# Patient Record
Sex: FEMALE | NOT HISPANIC OR LATINO | Employment: FULL TIME | URBAN - METROPOLITAN AREA
[De-identification: names, ages, dates, MRNs, and addresses within clinical notes are randomized per-mention and may not be internally consistent; named-entity substitution may affect disease eponyms.]

---

## 2019-12-28 ENCOUNTER — NURSE TRIAGE (OUTPATIENT)
Dept: NURSING | Facility: CLINIC | Age: 29
End: 2019-12-28

## 2019-12-28 NOTE — TELEPHONE ENCOUNTER
Wants to get an appointment with a female MD for evaluation of symptoms since the metronidazole didn't work.  I connected with scheduling for an appointment at the [facility].  She is a new patient. Her  will go and get some over the counter product to try in the mean time.  Isela Larry RN-[facility]

## 2020-01-03 ENCOUNTER — TELEPHONE (OUTPATIENT)
Dept: FAMILY MEDICINE | Facility: CLINIC | Age: 30
End: 2020-01-03

## 2020-01-03 ENCOUNTER — OFFICE VISIT (OUTPATIENT)
Dept: FAMILY MEDICINE | Facility: CLINIC | Age: 30
End: 2020-01-03
Payer: COMMERCIAL

## 2020-01-03 VITALS
TEMPERATURE: 98.8 F | BODY MASS INDEX: 18.88 KG/M2 | HEART RATE: 79 BPM | OXYGEN SATURATION: 97 % | WEIGHT: 100 LBS | SYSTOLIC BLOOD PRESSURE: 100 MMHG | HEIGHT: 61 IN | DIASTOLIC BLOOD PRESSURE: 63 MMHG

## 2020-01-03 DIAGNOSIS — R30.0 DYSURIA: Primary | ICD-10-CM

## 2020-01-03 DIAGNOSIS — Z12.4 SCREENING FOR MALIGNANT NEOPLASM OF CERVIX: ICD-10-CM

## 2020-01-03 DIAGNOSIS — N89.8 VAGINAL ODOR: ICD-10-CM

## 2020-01-03 LAB
ALBUMIN UR-MCNC: NEGATIVE MG/DL
APPEARANCE UR: CLEAR
BILIRUB UR QL STRIP: NEGATIVE
COLOR UR AUTO: YELLOW
GLUCOSE UR STRIP-MCNC: NEGATIVE MG/DL
HGB UR QL STRIP: NEGATIVE
KETONES UR STRIP-MCNC: NEGATIVE MG/DL
LEUKOCYTE ESTERASE UR QL STRIP: ABNORMAL
NITRATE UR QL: NEGATIVE
NON-SQ EPI CELLS #/AREA URNS LPF: NORMAL /LPF
PH UR STRIP: 6.5 PH (ref 5–7)
RBC #/AREA URNS AUTO: NORMAL /HPF
SOURCE: ABNORMAL
SP GR UR STRIP: <=1.005 (ref 1–1.03)
SPECIMEN SOURCE: NORMAL
UROBILINOGEN UR STRIP-ACNC: 0.2 EU/DL (ref 0.2–1)
WBC #/AREA URNS AUTO: NORMAL /HPF
WET PREP SPEC: NORMAL

## 2020-01-03 PROCEDURE — 87210 SMEAR WET MOUNT SALINE/INK: CPT | Performed by: PHYSICIAN ASSISTANT

## 2020-01-03 PROCEDURE — 99203 OFFICE O/P NEW LOW 30 MIN: CPT | Performed by: PHYSICIAN ASSISTANT

## 2020-01-03 PROCEDURE — G0145 SCR C/V CYTO,THINLAYER,RESCR: HCPCS | Performed by: PHYSICIAN ASSISTANT

## 2020-01-03 PROCEDURE — 81001 URINALYSIS AUTO W/SCOPE: CPT | Performed by: PHYSICIAN ASSISTANT

## 2020-01-03 RX ORDER — FLUCONAZOLE 150 MG/1
150 TABLET ORAL ONCE
Qty: 1 TABLET | Refills: 0 | Status: SHIPPED | OUTPATIENT
Start: 2020-01-03 | End: 2020-03-17

## 2020-01-03 ASSESSMENT — MIFFLIN-ST. JEOR: SCORE: 1122.01

## 2020-01-03 ASSESSMENT — PAIN SCALES - GENERAL: PAINLEVEL: SEVERE PAIN (6)

## 2020-01-03 NOTE — PROGRESS NOTES
"Subjective     Due to language barrier, an  was present during the history-taking and subsequent discussion (and for part of the physical exam) with this patient.    Flor Lopes is a 29 year old female who presents to clinic today for the following health issues:    HPI   URINARY TRACT SYMPTOMS  Onset: 1 month    Description:   Painful urination (Dysuria): YES           Frequency: YES  Blood in urine (Hematuria): no   Delay in urine (Hesitency): YES    Intensity: moderate    Progression of Symptoms:  same    Accompanying Signs & Symptoms:  Fever/chills: no   Flank pain no   Nausea and vomiting: no   Any vaginal symptoms: vaginal odor  Abdominal/Pelvic Pain: no     History:   History of frequent UTI's: no   History of kidney stones: no   Sexually Active: YES  Possibility of pregnancy: No    Precipitating factors:       Therapies Tried and outcome: Increase fluid intake    No vaginal itching.  Doesn't get uti or vaginal infections.  No concern for std      There is no problem list on file for this patient.    History reviewed. No pertinent surgical history.    Social History     Tobacco Use     Smoking status: Never Smoker     Smokeless tobacco: Never Used   Substance Use Topics     Alcohol use: Not Currently     History reviewed. No pertinent family history.          Reviewed and updated as needed this visit by Provider         Review of Systems   As above      Objective    /63 (BP Location: Right arm, Patient Position: Chair, Cuff Size: Adult Small)   Pulse 79   Temp 98.8  F (37.1  C) (Oral)   Ht 1.543 m (5' 0.75\")   Wt 45.4 kg (100 lb)   LMP 12/10/2019   SpO2 97%   Breastfeeding No   BMI 19.05 kg/m    Body mass index is 19.05 kg/m .  Physical Exam  Constitutional:       General: She is not in acute distress.  Genitourinary:     Vagina: Vaginal discharge (thick and white ) present.      Cervix: Normal.   Neurological:      Mental Status: She is alert.            Diagnostic Test " Results:  Results for orders placed or performed in visit on 01/03/20 (from the past 24 hour(s))   UA reflex to Microscopic and Culture   Result Value Ref Range    Color Urine Yellow     Appearance Urine Clear     Glucose Urine Negative NEG^Negative mg/dL    Bilirubin Urine Negative NEG^Negative    Ketones Urine Negative NEG^Negative mg/dL    Specific Gravity Urine <=1.005 1.003 - 1.035    Blood Urine Negative NEG^Negative    pH Urine 6.5 5.0 - 7.0 pH    Protein Albumin Urine Negative NEG^Negative mg/dL    Urobilinogen Urine 0.2 0.2 - 1.0 EU/dL    Nitrite Urine Negative NEG^Negative    Leukocyte Esterase Urine Trace (A) NEG^Negative    Source Midstream Urine    Urine Microscopic   Result Value Ref Range    WBC Urine 0 - 5 OTO5^0 - 5 /HPF    RBC Urine O - 2 OTO2^O - 2 /HPF    Squamous Epithelial /LPF Urine Few FEW^Few /LPF   Wet prep   Result Value Ref Range    Specimen Description Vagina     Wet Prep No Trichomonas seen     Wet Prep No clue cells seen     Wet Prep No yeast seen     Wet Prep Few  WBC'S seen              Assessment & Plan     1. Dysuria  Labs normal  - UA reflex to Microscopic and Culture  - Urine Microscopic    2. Vaginal odor  Will treat for yeast given the amount of discharge.  Pt notified after office visit   - Wet prep    3. Screening for malignant neoplasm of cervix    - Pap imaged thin layer screen reflex to HPV if ASCUS - recommend age 25 - 29           Return in about 1 week (around 1/10/2020) for if not improving.    Yenny Gomez PA-C  [facility]

## 2020-01-03 NOTE — TELEPHONE ENCOUNTER
Panel Management Review      Patient has the following on her problem list: None      Composite cancer screening  Chart review shows that this patient is due/due soon for the following Pap Smear  Summary:    Patient is due/failing the following:   PAP    Action needed:   Patient in office today and discussed HM item due, patient was willing to complete today the pap screening test.    Type of outreach:    see above    Questions for provider review:    None                                                                                                                                    ALDAIR Daniel MA       Chart routed to closing encounter .

## 2020-01-03 NOTE — TELEPHONE ENCOUNTER
Received the following message from Yenny Gomez PA-C:    Please call  (Pt was to sign a consent today at office visit) and let him know her labs are all normal but given the amount of discharge I am going to send a prescription for diflucan.

## 2020-01-03 NOTE — LETTER
January 8, 2020      Flor Lopes  [address] [address]    Dear MsHermesMarianne,      I am happy to inform you that your recent cervical cancer screening test (PAP smear) was normal.      Preventative screenings such as this help to ensure your health for years to come. You should repeat a pap smear in 3 years, unless otherwise directed.      You will still need to return to the clinic every year for your annual exam and other preventive tests.     If you have additional questions regarding this result, please call our registered nurse, Josey at [phone].      Sincerely,      Yenny Gomez PA-C //[facility]

## 2020-01-07 LAB
COPATH REPORT: NORMAL
PAP: NORMAL

## 2020-01-15 ENCOUNTER — OFFICE VISIT (OUTPATIENT)
Dept: FAMILY MEDICINE | Facility: CLINIC | Age: 30
End: 2020-01-15
Payer: COMMERCIAL

## 2020-01-15 VITALS
DIASTOLIC BLOOD PRESSURE: 62 MMHG | WEIGHT: 103 LBS | HEART RATE: 76 BPM | HEIGHT: 61 IN | SYSTOLIC BLOOD PRESSURE: 101 MMHG | TEMPERATURE: 98.5 F | BODY MASS INDEX: 19.45 KG/M2

## 2020-01-15 DIAGNOSIS — N94.89 VAGINAL BURNING: Primary | ICD-10-CM

## 2020-01-15 LAB
ANION GAP SERPL CALCULATED.3IONS-SCNC: 4 MMOL/L (ref 3–14)
BUN SERPL-MCNC: 13 MG/DL (ref 7–30)
CALCIUM SERPL-MCNC: 9.5 MG/DL (ref 8.5–10.1)
CHLORIDE SERPL-SCNC: 106 MMOL/L (ref 94–109)
CO2 SERPL-SCNC: 28 MMOL/L (ref 20–32)
CREAT SERPL-MCNC: 0.56 MG/DL (ref 0.52–1.04)
GFR SERPL CREATININE-BSD FRML MDRD: >90 ML/MIN/{1.73_M2}
GLUCOSE SERPL-MCNC: 91 MG/DL (ref 70–99)
POTASSIUM SERPL-SCNC: 4.3 MMOL/L (ref 3.4–5.3)
SODIUM SERPL-SCNC: 138 MMOL/L (ref 133–144)
SPECIMEN SOURCE: NORMAL
WET PREP SPEC: NORMAL

## 2020-01-15 PROCEDURE — 87210 SMEAR WET MOUNT SALINE/INK: CPT | Performed by: PHYSICIAN ASSISTANT

## 2020-01-15 PROCEDURE — 87591 N.GONORRHOEAE DNA AMP PROB: CPT | Performed by: PHYSICIAN ASSISTANT

## 2020-01-15 PROCEDURE — 80048 BASIC METABOLIC PNL TOTAL CA: CPT | Performed by: PHYSICIAN ASSISTANT

## 2020-01-15 PROCEDURE — 87491 CHLMYD TRACH DNA AMP PROBE: CPT | Performed by: PHYSICIAN ASSISTANT

## 2020-01-15 PROCEDURE — 99213 OFFICE O/P EST LOW 20 MIN: CPT | Performed by: PHYSICIAN ASSISTANT

## 2020-01-15 PROCEDURE — 36415 COLL VENOUS BLD VENIPUNCTURE: CPT | Performed by: PHYSICIAN ASSISTANT

## 2020-01-15 RX ORDER — NYSTATIN 100000 U/G
CREAM TOPICAL 2 TIMES DAILY
Qty: 30 G | Refills: 1 | Status: SHIPPED | OUTPATIENT
Start: 2020-01-15 | End: 2021-08-23

## 2020-01-15 ASSESSMENT — MIFFLIN-ST. JEOR: SCORE: 1136.83

## 2020-01-15 NOTE — PROGRESS NOTES
"Subjective     Flor Lopes is a 29 year old female who presents to clinic today for the following health issues:  Due to language barrier, an  was present during the history-taking and subsequent discussion (and for part of the physical exam) with this patient.    HPI   Vaginal Symptoms  Onset: 1 month     Description:  Vaginal Discharge: none   Itching (Pruritis): no   Burning sensation:  YES   Odor: no     Accompanying Signs & Symptoms:  Pain with Urination: YES  Abdominal Pain: no   Fever: no     History:   Sexually active: no   New Partner: no   Possibility of Pregnancy:  No    Precipitating factors:   Recent Antibiotic Use: no     Alleviating factors:  None     Therapies Tried and outcome: finished Diflucan -better but not gone   No skin lesions.    No blood in urine.  No increase in frequency or leaking.  No fevers. No concerns for STD       There is no problem list on file for this patient.    No past surgical history on file.    Social History     Tobacco Use     Smoking status: Never Smoker     Smokeless tobacco: Never Used   Substance Use Topics     Alcohol use: Not Currently     No family history on file.          Reviewed and updated as needed this visit by Provider         Review of Systems   As above      Objective    /62   Pulse 76   Temp 98.5  F (36.9  C) (Oral)   Ht 1.545 m (5' 0.83\")   Wt 46.7 kg (103 lb)   LMP 12/10/2019 (Exact Date)   BMI 19.57 kg/m    Body mass index is 19.57 kg/m .  Physical Exam  Constitutional:       General: She is not in acute distress.  Cardiovascular:      Rate and Rhythm: Normal rate and regular rhythm.   Pulmonary:      Effort: Pulmonary effort is normal.      Breath sounds: Normal breath sounds.   Abdominal:      Tenderness: There is no abdominal tenderness. There is no right CVA tenderness or left CVA tenderness.   Neurological:      Mental Status: She is alert.            Diagnostic Test Results:  Labs reviewed in Epic        Assessment & Plan "     1. Vaginal burning  Will give nystatin since did improve some on diflucan.  Will check stds to be complete.  Also r/o diabetes with labs.    - Wet prep  - Chlamydia trachomatis PCR  - Neisseria gonorrhoeae PCR  - Basic metabolic panel  - nystatin (MYCOSTATIN) 170461 UNIT/GM external cream; Apply topically 2 times daily To vaginal area, use thin layer  Dispense: 30 g; Refill: 1           No follow-ups on file.    Yenny Gomez PA-C  [facility]

## 2020-01-15 NOTE — LETTER
[facility]   [address] [place]  [address]  [phone]                                   January 20, 2020    Flor Lopes  [address] [address]        Dear Flor,    Sexually transmitted disease testing is negative and her blood sugar is normal.  (she gave permission in office to give this info to her )    Results for orders placed or performed in visit on 01/15/20   Basic metabolic panel     Status: None   Result Value Ref Range    Sodium 138 133 - 144 mmol/L    Potassium 4.3 3.4 - 5.3 mmol/L    Chloride 106 94 - 109 mmol/L    Carbon Dioxide 28 20 - 32 mmol/L    Anion Gap 4 3 - 14 mmol/L    Glucose 91 70 - 99 mg/dL    Urea Nitrogen 13 7 - 30 mg/dL    Creatinine 0.56 0.52 - 1.04 mg/dL    GFR Estimate >90 >60 mL/min/[1.73_m2]    GFR Estimate If Black >90 >60 mL/min/[1.73_m2]    Calcium 9.5 8.5 - 10.1 mg/dL   Wet prep     Status: None   Result Value Ref Range    Specimen Description Vagina     Wet Prep No Trichomonas seen     Wet Prep No clue cells seen     Wet Prep No yeast seen     Wet Prep Rare  WBC'S seen      Neisseria gonorrhoeae PCR     Status: None   Result Value Ref Range    Specimen Descrip Urine     N Gonorrhea PCR Negative NEG^Negative   Chlamydia trachomatis PCR     Status: None   Result Value Ref Range    Specimen Description Urine     Chlamydia Trachomatis PCR Negative NEG^Negative       If you have any questions please call the clinic at [phone]    Sincerely,    Yenny Gomez PA-C    [facility]

## 2020-01-16 LAB
C TRACH DNA SPEC QL NAA+PROBE: NEGATIVE
N GONORRHOEA DNA SPEC QL NAA+PROBE: NEGATIVE
SPECIMEN SOURCE: NORMAL
SPECIMEN SOURCE: NORMAL

## 2020-03-17 ENCOUNTER — OFFICE VISIT (OUTPATIENT)
Dept: FAMILY MEDICINE | Facility: CLINIC | Age: 30
End: 2020-03-17
Payer: COMMERCIAL

## 2020-03-17 VITALS
WEIGHT: 103 LBS | HEART RATE: 76 BPM | OXYGEN SATURATION: 98 % | SYSTOLIC BLOOD PRESSURE: 91 MMHG | DIASTOLIC BLOOD PRESSURE: 69 MMHG | BODY MASS INDEX: 19.57 KG/M2 | TEMPERATURE: 98.2 F

## 2020-03-17 DIAGNOSIS — R30.0 DYSURIA: Primary | ICD-10-CM

## 2020-03-17 PROCEDURE — 99214 OFFICE O/P EST MOD 30 MIN: CPT | Performed by: PHYSICIAN ASSISTANT

## 2020-03-17 RX ORDER — TRIAMCINOLONE ACETONIDE 1 MG/G
CREAM TOPICAL 2 TIMES DAILY
Qty: 30 G | Refills: 1 | Status: SHIPPED | OUTPATIENT
Start: 2020-03-17 | End: 2021-08-23

## 2020-03-17 RX ORDER — CLOBETASOL PROPIONATE 0.5 MG/G
CREAM TOPICAL 2 TIMES DAILY
Qty: 60 G | Refills: 1 | Status: CANCELLED | OUTPATIENT
Start: 2020-03-17

## 2020-03-17 NOTE — PROGRESS NOTES
Subjective     Flor Lopes is a 29 year old female who presents to clinic today for the following health issues:    HPI   URINARY TRACT SYMPTOMS- burning with urination x 3 months   Onset:     Description:   Painful urination (Dysuria): YES on and off            Frequency: no   Blood in urine (Hematuria): no   Delay in urine (Hesitency): no     Intensity: 7-8 /10    Progression of Symptoms:  improving    Accompanying Signs & Symptoms:  Fever/chills: no   Flank pain no   Nausea and vomiting: no   Any vaginal symptoms: none  Abdominal/Pelvic Pain: no     History:   History of frequent UTI's: no   History of kidney stones: no   Sexually Active: YES  Possibility of pregnancy: No    Precipitating factors:   None     Therapies Tried and outcome: NYSTATIN CREAM-NOT HELPING   slightly better  No sores or rashes in vaginal area.  No concern for STD. No vaginal discharge.  No urinary frequency or leaking of urine.  Some days feels tired and goes small amount with burning with urination.    No relation to period.    BM not daily -2 times weekly is her normal.    Ua, sexually transmitted disease testing, wet prep and exam in recent past all normal.          There is no problem list on file for this patient.    History reviewed. No pertinent surgical history.    Social History     Tobacco Use     Smoking status: Never Smoker     Smokeless tobacco: Never Used   Substance Use Topics     Alcohol use: Not Currently     History reviewed. No pertinent family history.          Reviewed and updated as needed this visit by Provider  Allergies  Meds         Review of Systems   As above      Objective    BP 91/69   Pulse 76   Temp 98.2  F (36.8  C) (Oral)   Wt 46.7 kg (103 lb)   LMP 02/18/2020 (Exact Date)   SpO2 98%   BMI 19.57 kg/m    Body mass index is 19.57 kg/m .  Physical Exam  Constitutional:       General: She is not in acute distress.  Cardiovascular:      Rate and Rhythm: Normal rate and regular rhythm.   Pulmonary:       Effort: Pulmonary effort is normal.      Breath sounds: Normal breath sounds.   Abdominal:      General: Bowel sounds are normal.      Tenderness: There is abdominal tenderness (suprapubic ).   Neurological:      Mental Status: She is alert.            Diagnostic Test Results:  Labs reviewed in Epic        Assessment & Plan     1. Dysuria  Suspect constipation and dehydration is playing a role.  Can try cream and push fluids.    - triamcinolone (KENALOG) 0.1 % external cream; Apply topically 2 times daily To vaginal area for 2 weeks  Dispense: 30 g; Refill: 1           Return in about 2 weeks (around 3/31/2020) for if not better call , if not improving.    Yenny Gomez PA-C  [facility]

## 2021-04-25 ENCOUNTER — HEALTH MAINTENANCE LETTER (OUTPATIENT)
Age: 31
End: 2021-04-25

## 2021-04-27 ENCOUNTER — IMMUNIZATION (OUTPATIENT)
Dept: NURSING | Facility: CLINIC | Age: 31
End: 2021-04-27
Payer: COMMERCIAL

## 2021-04-27 PROCEDURE — 91300 PR COVID VAC PFIZER DIL RECON 30 MCG/0.3 ML IM: CPT

## 2021-04-27 PROCEDURE — 0001A PR COVID VAC PFIZER DIL RECON 30 MCG/0.3 ML IM: CPT

## 2021-05-10 ENCOUNTER — APPOINTMENT (OUTPATIENT)
Dept: INTERPRETER SERVICES | Facility: CLINIC | Age: 31
End: 2021-05-10
Payer: COMMERCIAL

## 2021-05-10 ENCOUNTER — OFFICE VISIT (OUTPATIENT)
Dept: FAMILY MEDICINE | Facility: CLINIC | Age: 31
End: 2021-05-10
Payer: COMMERCIAL

## 2021-05-10 VITALS
SYSTOLIC BLOOD PRESSURE: 95 MMHG | HEIGHT: 61 IN | HEART RATE: 85 BPM | DIASTOLIC BLOOD PRESSURE: 62 MMHG | BODY MASS INDEX: 19.83 KG/M2 | WEIGHT: 105 LBS | TEMPERATURE: 97.6 F

## 2021-05-10 DIAGNOSIS — M25.532 LEFT WRIST PAIN: Primary | ICD-10-CM

## 2021-05-10 PROCEDURE — 99213 OFFICE O/P EST LOW 20 MIN: CPT | Performed by: PHYSICIAN ASSISTANT

## 2021-05-10 ASSESSMENT — MIFFLIN-ST. JEOR: SCORE: 1139.69

## 2021-05-10 NOTE — PROGRESS NOTES
"    Assessment & Plan     Left wrist pain  Likely sprain.  If not improving start PT  - Wrist/Arm/Hand Supplies Order                 Return in about 3 weeks (around 5/31/2021) for if not improving.    DELIA Verma [facility] VIOLETTE Ray is a 30 year old who presents for the following health issues  accompanied by her :    SHYAM      L wrist pain   Onset/Duration: 1 month   Description  Location: wrist - left  Joint Swelling: no  Redness: no  Pain: YES  Warmth: no  Intensity:  3/10  Progression of Symptoms:  improving  Accompanying signs and symptoms:   Fevers: no  Numbness/tingling/weakness: YES weakness   History  Trauma to the area: no  Recent illness:  no  Previous similar problem: no  Previous evaluation:  no  Precipitating or alleviating factors:  Aggravating factors include: overuse  For work does a lot of twisting.    Therapies tried and outcome: patch for pain OTC   No injury  Hard to push up on that wrist.  No tingling in hand.          Review of Systems   As above      Objective    BP 95/62   Pulse 85   Temp 97.6  F (36.4  C) (Tympanic)   Ht 1.543 m (5' 0.75\")   Wt 47.6 kg (105 lb)   BMI 20.00 kg/m    Body mass index is 20 kg/m .  Physical Exam  Constitutional:       General: She is not in acute distress.  Musculoskeletal:      Right wrist: Normal.      Left wrist: She exhibits normal range of motion and no tenderness.      Right hand: Normal.        Hands:    Neurological:      Mental Status: She is alert.                        DME (Durable Medical Equipment) Orders and Documentation  Orders Placed This Encounter   Procedures     Wrist/Arm/Hand Supplies Order      The patient was assessed and it was determined the patient is in need of the following listed DME Supplies/Equipment. Please complete supporting documentation below to demonstrate medical necessity.      Wrist/Arm/Hand Bracing Supplies Documentation  Patient requires the use of the " ordered bracing device due to following medical need/condition: hand pain

## 2021-05-10 NOTE — PATIENT INSTRUCTIONS
Wear brace most of the time-especially at work.  If not better in a few weeks let me know and I'll order physical therapy

## 2021-05-18 ENCOUNTER — IMMUNIZATION (OUTPATIENT)
Dept: NURSING | Facility: CLINIC | Age: 31
End: 2021-05-18
Attending: INTERNAL MEDICINE
Payer: COMMERCIAL

## 2021-05-18 PROCEDURE — 0002A PR COVID VAC PFIZER DIL RECON 30 MCG/0.3 ML IM: CPT

## 2021-05-18 PROCEDURE — 91300 PR COVID VAC PFIZER DIL RECON 30 MCG/0.3 ML IM: CPT

## 2021-08-23 ENCOUNTER — NURSE TRIAGE (OUTPATIENT)
Dept: NURSING | Facility: CLINIC | Age: 31
End: 2021-08-23

## 2021-08-23 ENCOUNTER — MYC MEDICAL ADVICE (OUTPATIENT)
Dept: FAMILY MEDICINE | Facility: CLINIC | Age: 31
End: 2021-08-23

## 2021-08-23 ENCOUNTER — E-VISIT (OUTPATIENT)
Dept: FAMILY MEDICINE | Facility: CLINIC | Age: 31
End: 2021-08-23
Payer: COMMERCIAL

## 2021-08-23 DIAGNOSIS — K12.0 APHTHOUS ULCER OF MOUTH: Primary | ICD-10-CM

## 2021-08-23 PROCEDURE — 99421 OL DIG E/M SVC 5-10 MIN: CPT | Performed by: FAMILY MEDICINE

## 2021-08-23 RX ORDER — TRIAMCINOLONE ACETONIDE 0.25 MG/G
OINTMENT TOPICAL 2 TIMES DAILY
Qty: 15 G | Refills: 0 | Status: SHIPPED | OUTPATIENT
Start: 2021-08-23 | End: 2021-08-24 | Stop reason: ALTCHOICE

## 2021-08-23 NOTE — TELEPHONE ENCOUNTER
Clinic Action Needed: Yes.     Reason for Call: Patient's  Raudel calling with concerns about applying patient's medication triamcinolone (KENALOG) 0.025 % external ointment on her lip.     States the instructions states don't take it by mouth. Informed patient the medication is topical and to apply small amount on patient's lip (affected area).     Routed to:  [employer] [[id number]]      Sammy Niño RN  [facility]

## 2021-08-23 NOTE — PATIENT INSTRUCTIONS
Dear Flor,    Thanks for choosing us as your health care partner.    You have an aphthous ulcer of the mouth. The cause is usually unknown. Symptoms generally go away on their own, but treatment includes keeping the area clean and avoiding trauma to the area. Over-the-counter topical like lidocaine or Orajel are fine to use.     I can also offer a prescription medication, a topical steroid ointment, and have sent a prescription to your pharmacy. Your pharmacist can give you additional information. See your MyChart visit summary.     Call or return to clinic as needed if these symptoms worsen or fail to improve as anticipated.     Kalpesh,   Angelina Obregon MD

## 2021-08-24 RX ORDER — TRIAMCINOLONE ACETONIDE 0.1 %
PASTE (GRAM) DENTAL
Qty: 15 G | Refills: 0 | Status: SHIPPED | OUTPATIENT
Start: 2021-08-24 | End: 2023-08-23

## 2021-08-24 NOTE — TELEPHONE ENCOUNTER
There is a triamcinolone paste commercially available that can be used in the mouth; the ointment should only be used externally.

## 2021-08-24 NOTE — TELEPHONE ENCOUNTER
See [email or web address] message sent from patient 08/24. Her question was forwarded to prescribing provider.

## 2021-10-10 ENCOUNTER — HEALTH MAINTENANCE LETTER (OUTPATIENT)
Age: 31
End: 2021-10-10

## 2022-03-23 ENCOUNTER — IMMUNIZATION (OUTPATIENT)
Dept: NURSING | Facility: CLINIC | Age: 32
End: 2022-03-23
Payer: COMMERCIAL

## 2022-03-23 PROCEDURE — 0054A COVID-19,PF,PFIZER (12+ YRS): CPT

## 2022-03-23 PROCEDURE — 91305 COVID-19,PF,PFIZER (12+ YRS): CPT

## 2022-03-30 ENCOUNTER — OFFICE VISIT (OUTPATIENT)
Dept: FAMILY MEDICINE | Facility: CLINIC | Age: 32
End: 2022-03-30
Payer: COMMERCIAL

## 2022-03-30 VITALS
TEMPERATURE: 98.9 F | OXYGEN SATURATION: 100 % | BODY MASS INDEX: 20.39 KG/M2 | DIASTOLIC BLOOD PRESSURE: 66 MMHG | SYSTOLIC BLOOD PRESSURE: 101 MMHG | HEIGHT: 61 IN | WEIGHT: 108 LBS | HEART RATE: 77 BPM

## 2022-03-30 DIAGNOSIS — N63.0 LUMP OR MASS IN BREAST: Primary | ICD-10-CM

## 2022-03-30 DIAGNOSIS — Z80.3 FAMILY HISTORY OF MALIGNANT NEOPLASM OF BREAST: ICD-10-CM

## 2022-03-30 DIAGNOSIS — M26.609 TMJ (TEMPOROMANDIBULAR JOINT SYNDROME): ICD-10-CM

## 2022-03-30 PROCEDURE — 90472 IMMUNIZATION ADMIN EACH ADD: CPT | Performed by: NURSE PRACTITIONER

## 2022-03-30 PROCEDURE — 90686 IIV4 VACC NO PRSV 0.5 ML IM: CPT | Performed by: NURSE PRACTITIONER

## 2022-03-30 PROCEDURE — 90715 TDAP VACCINE 7 YRS/> IM: CPT | Performed by: NURSE PRACTITIONER

## 2022-03-30 PROCEDURE — 90471 IMMUNIZATION ADMIN: CPT | Performed by: NURSE PRACTITIONER

## 2022-03-30 PROCEDURE — 99213 OFFICE O/P EST LOW 20 MIN: CPT | Mod: 25 | Performed by: NURSE PRACTITIONER

## 2022-03-30 NOTE — PROGRESS NOTES
"  Assessment & Plan     Lump or mass in breast  Recommend imaging, worrisome with her family history.  - US Breast Right Limited 1-3 Quadrants; Future    Family history of malignant neoplasm of breast  As above  - US Breast Right Limited 1-3 Quadrants; Future    TMJ (temporomandibular joint syndrome)  Recommend avoiding chewing gum, sticky candies, relax jaw- use heat, massage, Ibuprofen as needed      Ordering of each unique test         See Patient Instructions    Return in about 3 months (around 6/30/2022) for Physical with PCP.    SOHAN Schmitt [facility] VIOLETTE Agudelo is a 31 year old who presents for the following health issues  accompanied by her  via phone.    History of Present Illness       Reason for visit:  Breast lump  Symptom onset:  3-7 days ago  Symptoms include:  Lump on breast  Symptom intensity:  Mild  Symptom progression:  Worsening  Had these symptoms before:  No  What makes it worse:  No  What makes it better:  No    She eats 2-3 servings of fruits and vegetables daily.She consumes 1 sweetened beverage(s) daily.She exercises with enough effort to increase her heart rate 9 or less minutes per day.  She exercises with enough effort to increase her heart rate 3 or less days per week.   She is taking medications regularly.     Noted lump in breast 1 week ago, now becoming painful. Sister had  Breast cancer at age 30. No prior masses or mammo/US personally.    Right jaw became painful and hard to open last night.    Review of Systems   Constitutional, HEENT, cardiovascular, pulmonary, gi and gu systems are negative, except as otherwise noted.      Objective    /66 (BP Location: Right arm, Patient Position: Chair, Cuff Size: Adult Regular)   Pulse 77   Temp 98.9  F (37.2  C) (Oral)   Ht 1.543 m (5' 0.75\")   Wt 49 kg (108 lb)   SpO2 100%   BMI 20.57 kg/m    Body mass index is 20.57 kg/m .  Physical Exam   GENERAL: healthy, alert and " no distress  HENT: normal cephalic/atraumatic, right ear: normal: no effusions, no erythema, normal landmarks and right TMJ tender with limited ROM  BREAST: 1.5x1 cm smooth, mobile mass right breast at 12 o'clock approx 2 cm from nipple    No results found for any visits on 03/30/22.

## 2022-03-30 NOTE — PATIENT INSTRUCTIONS
Call [facility] for your Diagnostic Mammogram at: [phone]    Patient Education     TMJ Syndrome  The temporomandibular joint (TMJ) is the joint that connects your lower jaw to your skull. You can feel it in front of your ears when you open and close your mouth. TMJ disorders cause chronic or recurrent pain and problems in the jaw joint and the muscles that control jaw movement. Symptoms of TMJ disorders are usually relieved with minor treatments. But symptoms may come back, especially in times of stress.   Causes  There is no widely agreed-on cause of TMJ disorders. They have been linked to injury, arthritis, tooth and jaw alignment, chronic fatigue syndrome, and fibromyalgia. A definite connection has not been shown to these, though.   Symptoms    Pain in the face, jaw, or neck    Pain with jaw movement or chewing    Locking or catching sensation of the jaw    Clicking, popping, or grinding sounds with movement of the TMJ    Headache    Ear pain    Home care  Modest treatments are a good first step toward relieving symptoms. Try these methods.     Reduce stress on your jaw by not eating crunchy or hard-to-chew foods. Don t eat hard or sticky candies. Soft foods and liquids are easier on the jaw.    Protect your jaw while yawning. If you need to yawn, put your fist under your chin to prevent your mouth from opening too wide.    To help relieve pain, put hot or cold packs on the area that hurts. Try both hot and cold to find out which works best for you. To make a cold pack, put ice cubes in a plastic bag that seals at the top. Wrap the bag in a clean, thin towel or cloth. Never put ice or an ice pack directly on the skin. If you use hot packs (small towels soaked in hot water), be careful not to burn yourself.    You may take acetaminophen or ibuprofen for pain, unless you were given a different pain medicine. (Note: If you have chronic liver or kidney disease or have ever had a stomach ulcer or  gastrointestinal bleeding, talk with your healthcare provider before using these medicines. Also talk to your provider if you are taking medicine to prevent blood clots.) Don t give aspirin to a child younger than age 19 unless directed by the child s provider. Taking aspirin can put a child at risk for Reye syndrome. This is a rare but very serious disorder that most often affects the brain and the liver.  Reducing stress  If stress seems to be contributing to your symptoms, try to find the sources of stress in your life. These aren t always obvious. Common stressors include:     Everyday hassles. These include things such as traffic jams, missed appointments, or car trouble.    Major life changes. These can be good, such as a new baby or job promotion. Or they can be bad, such as losing a job or losing a loved one.    Overload. This is the feeling that you have too many responsibilities and can't take care of everything at once.    Helplessness. This is when you feel like your problems are more than you can solve.  When possible, try to make changes in your sources of stress. See if you can avoid hassles, limit the amount of change in your life at one time, and take breaks when you feel overloaded.   Many stressful situations can't be avoided. So learning how to manage stress is very important. To make everyday stress more manageable:     Getting regular exercise.    Eat nutritious, balanced meals.    Get enough rest.    Try relaxation and breathing exercises, visualization, biofeedback, or meditation.    Take some time out to clear your mind.  For more information, talk with your healthcare provider.  Follow-up care  Follow up with your healthcare provider, or as advised. More testing and other treatment may be needed. If changes to your lifestyle do not improve your symptoms, talk with your healthcare provider about other treatments. These include bite guards for help with teeth grinding, stress management  methods, and more. If stress is an important factor and does not respond to the above lifestyle changes, talk with your healthcare provider about a referral for stress management.   If X-rays were done, they will be reviewed by a specialist. You will be told the results and if they affect your treatment.   Call 911  Call 911 if you have any of these:     Trouble breathing or swallowing    Wheezing    Confusion    Extreme drowsiness or trouble awakening    Fainting or loss of consciousness    Fast heart rate  When to seek medical advice  Call your healthcare provider right away if you have any of these:     Swollen or red face    Jaw or face pain that gets worse    Neck, mouth, tooth, or throat pain that gets worse    Fever of 100.4 F (38 C) or higher, or as directed by your healthcare provider  [employer] last reviewed this educational content on 8/1/2020 2000-2021 [employer]. All rights reserved. This information is not intended as a substitute for professional medical care. Always follow your healthcare professional's instructions.

## 2022-04-01 ENCOUNTER — ANCILLARY PROCEDURE (OUTPATIENT)
Dept: MAMMOGRAPHY | Facility: CLINIC | Age: 32
End: 2022-04-01
Attending: NURSE PRACTITIONER
Payer: COMMERCIAL

## 2022-04-01 DIAGNOSIS — N63.0 LUMP OR MASS IN BREAST: ICD-10-CM

## 2022-04-01 DIAGNOSIS — Z80.3 FAMILY HISTORY OF MALIGNANT NEOPLASM OF BREAST: ICD-10-CM

## 2022-04-01 PROCEDURE — 76642 ULTRASOUND BREAST LIMITED: CPT | Mod: RT | Performed by: RADIOLOGY

## 2022-04-01 NOTE — LETTER
Jammie Lopes  [address] [facility] [address]            April 1, 2022    Date of Exam:       Dear Jammie:    Thank you for your recent visit.    Breast Imaging Result: Your breast imaging examination showed an area that we believe is benign (not cancer). We recommend that you come back again in six months for a short term right breast ultrasound follow up. If you have not already scheduled this follow-up exam, please call [phone].     As you know, early detection of cancer is very important. Although not all cancers are found through breast imaging it is the most accurate method for early detection. A thorough examination includes a combination of breast imaging and a physical examination by your health care professional. Therefore, if you have not had a recent physical exam of your breasts see your health care team.    A report of your breast imaging results was sent to: Tasneem Lentz    Your breast imaging will become part of your medical file here at [facility] for at least 10 years. You are responsible for informing any new health care team or breast imaging facility of the date and location of this examination.    We appreciate the opportunity to participate in your health care.    Sincerely,  Steve Barrera MD  [facility] [place]

## 2022-05-22 ENCOUNTER — HEALTH MAINTENANCE LETTER (OUTPATIENT)
Age: 32
End: 2022-05-22

## 2022-09-18 ENCOUNTER — HEALTH MAINTENANCE LETTER (OUTPATIENT)
Age: 32
End: 2022-09-18

## 2022-11-30 ENCOUNTER — ANCILLARY PROCEDURE (OUTPATIENT)
Dept: MAMMOGRAPHY | Facility: CLINIC | Age: 32
End: 2022-11-30
Payer: COMMERCIAL

## 2022-11-30 DIAGNOSIS — Z09 FOLLOW-UP EXAM: ICD-10-CM

## 2022-11-30 DIAGNOSIS — N63.0 LUMP OR MASS IN BREAST: ICD-10-CM

## 2022-11-30 PROCEDURE — 76642 ULTRASOUND BREAST LIMITED: CPT | Mod: RT | Performed by: RADIOLOGY

## 2023-03-01 ENCOUNTER — TELEPHONE (OUTPATIENT)
Dept: OBGYN | Facility: CLINIC | Age: 33
End: 2023-03-01
Payer: COMMERCIAL

## 2023-03-01 NOTE — TELEPHONE ENCOUNTER
Returned call. Please schedule Prenatal Intake with Nurse Only [place] and 1st OB visit with any provider if able.    [phone].    Dannielle Esteves, CMA

## 2023-03-01 NOTE — TELEPHONE ENCOUNTER
Reason for Call:  Appointment Request    Patient requesting this type of appt:  Pregnancy     Requested provider: OB    Reason patient unable to be scheduled: New OB    When does patient want to be seen/preferred time: First Availabilty    Comments: Spouse called and patient has a positive pregnancy test and is ready to schedule was transferred multiple times to get first OB scheduled    Could we send this information to you in [facility] or would you prefer to receive a phone call?:   Patient would prefer a phone call   Okay to leave a detailed message?: Yes at Other phone number:  [phone]    Call taken on 3/1/2023 at 1:48 PM by Linda Nam

## 2023-03-14 ENCOUNTER — PRENATAL OFFICE VISIT (OUTPATIENT)
Dept: OBGYN | Facility: CLINIC | Age: 33
End: 2023-03-14
Payer: COMMERCIAL

## 2023-03-14 DIAGNOSIS — Z34.00 SUPERVISION OF NORMAL FIRST PREGNANCY, ANTEPARTUM: Primary | ICD-10-CM

## 2023-03-14 DIAGNOSIS — Z23 NEED FOR TDAP VACCINATION: ICD-10-CM

## 2023-03-14 PROCEDURE — 99207 PR NO CHARGE NURSE ONLY: CPT

## 2023-03-14 RX ORDER — PRENATAL VIT/IRON FUM/FOLIC AC 27MG-0.8MG
1 TABLET ORAL DAILY
COMMUNITY
End: 2023-08-23

## 2023-03-14 NOTE — PROGRESS NOTES
Telephone visit with patient for New Prenatal Intake and Education. This is patient's first pregnancy. Handouts reviewed and will be provided at next prenatal appointment. Scheduled for New Prenatal with Dr. Robbins on 4/10/2023.     Prenatal OB Questionnaire  Patient supplied answers from flow sheet for:  Prenatal OB Questionnaire.  Past Medical History  Have you ever recieved care for your mental health? : No  Have you ever been in a major accident or suffered serious trauma?: (!) Yes (broke shoulder as a child)  Within the last year, has anyone hit, slapped, kicked or otherwise hurt you?: No  In the last year, has anyone forced you to have sex when you didn't want to?: No    Past Medical History 2   Have you ever received a blood transfusion?: No  Would you accept a blood transfusion if was medically recommended?: Yes  Does anyone in your home smoke?: No   Is your blood type Rh negative?: Unknown  Have you ever ?: No  Have you been hospitalized for a nonsurgical reason excluding normal delivery?: No  Have you ever had an abnormal pap smear?: No    Past Medical History (Continued)  Do you have a history of abnormalities of the uterus?: No  Did your mother take TROY or any other hormones when she was pregnant with you?: No  Do you have any other problems we have not asked about which you feel may be important to this pregnancy?: No       PHQ-2 Score:     PHQ-2 ( 1999 [employer]) 3/14/2023 3/30/2022   Q1: Little interest or pleasure in doing things 0 0   Q2: Feeling down, depressed or hopeless 0 0   PHQ-2 Score 0 0   PHQ-2 Total Score (12-17 Years)- Positive if 3 or more points; Administer PHQ-A if positive - -   Q1: Little interest or pleasure in doing things - Not at all   Q2: Feeling down, depressed or hopeless - Not at all   PHQ-2 Score - 0         Allergies as of 3/14/2023:    Allergies as of 03/14/2023     (No Known Allergies)       Current medications are:  Current Outpatient Medications   Medication  Sig Dispense Refill     Prenatal Vit-Fe Fumarate-FA (PRENATAL MULTIVITAMIN W/IRON) 27-0.8 MG tablet Take 1 tablet by mouth daily       COLLAGEN PO Take by mouth daily (Patient not taking: Reported on 3/14/2023)       triamcinolone (KENALOG) 0.1 % paste Apply to aphthous ulcer twice daily (Patient not taking: Reported on 3/30/2022) 15 g 0     Ela Araya RN on 3/14/2023 at 9:27 AM

## 2023-03-22 ENCOUNTER — ANCILLARY PROCEDURE (OUTPATIENT)
Dept: ULTRASOUND IMAGING | Facility: CLINIC | Age: 33
End: 2023-03-22
Attending: OBSTETRICS & GYNECOLOGY
Payer: COMMERCIAL

## 2023-03-22 DIAGNOSIS — Z34.00 SUPERVISION OF NORMAL FIRST PREGNANCY, ANTEPARTUM: ICD-10-CM

## 2023-03-22 PROCEDURE — 76801 OB US < 14 WKS SINGLE FETUS: CPT | Mod: TC | Performed by: RADIOLOGY

## 2023-03-22 PROCEDURE — 76817 TRANSVAGINAL US OBSTETRIC: CPT | Mod: TC | Performed by: RADIOLOGY

## 2023-03-28 LAB
ABO/RH(D): NORMAL
ANTIBODY SCREEN: NEGATIVE
SPECIMEN EXPIRATION DATE: NORMAL

## 2023-03-29 ENCOUNTER — LAB (OUTPATIENT)
Dept: LAB | Facility: CLINIC | Age: 33
End: 2023-03-29
Payer: COMMERCIAL

## 2023-03-29 DIAGNOSIS — Z34.00 SUPERVISION OF NORMAL FIRST PREGNANCY, ANTEPARTUM: ICD-10-CM

## 2023-03-29 LAB
ALBUMIN UR-MCNC: NEGATIVE MG/DL
APPEARANCE UR: CLEAR
BACTERIA #/AREA URNS HPF: ABNORMAL /HPF
BILIRUB UR QL STRIP: NEGATIVE
COLOR UR AUTO: YELLOW
ERYTHROCYTE [DISTWIDTH] IN BLOOD BY AUTOMATED COUNT: 12.3 % (ref 10–15)
GLUCOSE UR STRIP-MCNC: NEGATIVE MG/DL
HBV SURFACE AG SERPL QL IA: NONREACTIVE
HCT VFR BLD AUTO: 36.2 % (ref 35–47)
HCV AB SERPL QL IA: NONREACTIVE
HGB BLD-MCNC: 12.3 G/DL (ref 11.7–15.7)
HGB UR QL STRIP: ABNORMAL
HIV 1+2 AB+HIV1 P24 AG SERPL QL IA: NONREACTIVE
KETONES UR STRIP-MCNC: NEGATIVE MG/DL
LEUKOCYTE ESTERASE UR QL STRIP: ABNORMAL
MCH RBC QN AUTO: 29.9 PG (ref 26.5–33)
MCHC RBC AUTO-ENTMCNC: 34 G/DL (ref 31.5–36.5)
MCV RBC AUTO: 88 FL (ref 78–100)
NITRATE UR QL: NEGATIVE
PH UR STRIP: 7.5 [PH] (ref 5–7)
PLATELET # BLD AUTO: 248 10E3/UL (ref 150–450)
RBC # BLD AUTO: 4.12 10E6/UL (ref 3.8–5.2)
RBC #/AREA URNS AUTO: ABNORMAL /HPF
SP GR UR STRIP: 1.01 (ref 1–1.03)
SQUAMOUS #/AREA URNS AUTO: ABNORMAL /LPF
UROBILINOGEN UR STRIP-ACNC: 0.2 E.U./DL
WBC # BLD AUTO: 7.5 10E3/UL (ref 4–11)
WBC #/AREA URNS AUTO: ABNORMAL /HPF
WBC CLUMPS #/AREA URNS HPF: PRESENT /HPF

## 2023-03-29 PROCEDURE — 36415 COLL VENOUS BLD VENIPUNCTURE: CPT

## 2023-03-29 PROCEDURE — 87086 URINE CULTURE/COLONY COUNT: CPT

## 2023-03-29 PROCEDURE — 86901 BLOOD TYPING SEROLOGIC RH(D): CPT

## 2023-03-29 PROCEDURE — 86850 RBC ANTIBODY SCREEN: CPT

## 2023-03-29 PROCEDURE — 81001 URINALYSIS AUTO W/SCOPE: CPT

## 2023-03-29 PROCEDURE — 86900 BLOOD TYPING SEROLOGIC ABO: CPT

## 2023-03-29 PROCEDURE — 85027 COMPLETE CBC AUTOMATED: CPT

## 2023-03-29 PROCEDURE — 87340 HEPATITIS B SURFACE AG IA: CPT

## 2023-03-29 PROCEDURE — 86762 RUBELLA ANTIBODY: CPT

## 2023-03-29 PROCEDURE — 86780 TREPONEMA PALLIDUM: CPT

## 2023-03-29 PROCEDURE — 87186 SC STD MICRODIL/AGAR DIL: CPT

## 2023-03-29 PROCEDURE — 87389 HIV-1 AG W/HIV-1&-2 AB AG IA: CPT

## 2023-03-29 PROCEDURE — 86803 HEPATITIS C AB TEST: CPT

## 2023-03-30 LAB
BACTERIA UR CULT: ABNORMAL
RUBV IGG SERPL QL IA: 8.76 INDEX
RUBV IGG SERPL QL IA: POSITIVE
T PALLIDUM AB SER QL: NONREACTIVE

## 2023-03-31 DIAGNOSIS — N39.0 URINARY TRACT INFECTION WITHOUT HEMATURIA, SITE UNSPECIFIED: Primary | ICD-10-CM

## 2023-03-31 RX ORDER — AMOXICILLIN 500 MG/1
500 CAPSULE ORAL 3 TIMES DAILY
Qty: 21 CAPSULE | Refills: 0 | Status: SHIPPED | OUTPATIENT
Start: 2023-03-31 | End: 2023-04-07

## 2023-04-18 ENCOUNTER — PRENATAL OFFICE VISIT (OUTPATIENT)
Dept: OBGYN | Facility: CLINIC | Age: 33
End: 2023-04-18
Payer: COMMERCIAL

## 2023-04-18 VITALS
HEART RATE: 77 BPM | OXYGEN SATURATION: 100 % | BODY MASS INDEX: 20.73 KG/M2 | WEIGHT: 109.8 LBS | SYSTOLIC BLOOD PRESSURE: 100 MMHG | DIASTOLIC BLOOD PRESSURE: 66 MMHG | HEIGHT: 61 IN

## 2023-04-18 DIAGNOSIS — Z34.90 NORMAL INTRAUTERINE PREGNANCY, ANTEPARTUM: Primary | ICD-10-CM

## 2023-04-18 DIAGNOSIS — Z12.4 CERVICAL CANCER SCREENING: ICD-10-CM

## 2023-04-18 LAB
ALBUMIN UR-MCNC: NEGATIVE MG/DL
APPEARANCE UR: CLEAR
BILIRUB UR QL STRIP: NEGATIVE
COLOR UR AUTO: YELLOW
GLUCOSE UR STRIP-MCNC: NEGATIVE MG/DL
HGB UR QL STRIP: NEGATIVE
KETONES UR STRIP-MCNC: NEGATIVE MG/DL
LEUKOCYTE ESTERASE UR QL STRIP: NEGATIVE
NITRATE UR QL: NEGATIVE
PH UR STRIP: 7 [PH] (ref 5–7)
SP GR UR STRIP: 1.01 (ref 1–1.03)
UROBILINOGEN UR STRIP-ACNC: 0.2 E.U./DL

## 2023-04-18 PROCEDURE — 81003 URINALYSIS AUTO W/O SCOPE: CPT

## 2023-04-18 PROCEDURE — G0145 SCR C/V CYTO,THINLAYER,RESCR: HCPCS

## 2023-04-18 PROCEDURE — 87624 HPV HI-RISK TYP POOLED RSLT: CPT

## 2023-04-18 PROCEDURE — 87491 CHLMYD TRACH DNA AMP PROBE: CPT

## 2023-04-18 PROCEDURE — 87591 N.GONORRHOEAE DNA AMP PROB: CPT

## 2023-04-18 PROCEDURE — 99207 PR FIRST OB VISIT: CPT

## 2023-04-18 RX ORDER — PRENATAL VIT/IRON FUM/FOLIC AC 27MG-0.8MG
1 TABLET ORAL DAILY
Qty: 90 TABLET | Refills: 3 | Status: SHIPPED | OUTPATIENT
Start: 2023-04-18

## 2023-04-18 NOTE — PROGRESS NOTES
SUBJECTIVE:     HPI:    This is a 32 year old female patient,  who presents for her first obstetrical visit.    JOSE: 11/15/2023, by Ultrasound she is 9w6d. This is a variance from her LMP which predicted an JOSE of 10/29/2023. This was discussed with the patient.  Her cycles were irregular.    Her last menstrual period was normal for her.  Since her LMP, she has experienced  nausea and fatigue). She also had 2 episodes of light, dark-colored spotting in the last several weeks but no active persistent bleeding.     Additional History:   Urine positive for E coli with new OB labs. Patient has completed her anitbiotics.  Plan to retest urine today.  Patient states she is concerned that she cannot feel the baby and since the Ultrasound she states she has not felt very pregnant. This and the vaginal spotting are very worrisome to both the patient and her .    Past OB Hx: None    Last PAP: None     Father of baby: Hepatitis B POSITIVE.    Have you travelled during the pregnancy?No  Have your sexual partner(s) travelled during the pregnancy?No    HISTORY:   Planned Pregnancy: Yes  Marital Status:   Occupation:   Living in Household: Spouse    Patient Active Problem List   Diagnosis     Family history of malignant neoplasm of breast     Need for Tdap vaccination     No past surgical history on file.   Social History     Tobacco Use     Smoking status: Never     Smokeless tobacco: Never   Vaping Use     Vaping status: Never Used   Substance Use Topics     Alcohol use: Not Currently      Problem (# of Occurrences) Relation (Name,Age of Onset)    Arthritis (1) Father    Diabetes (1) Mother    Heart Disease (1) Mother    Breast Cancer (1) Sister (30)            Current Outpatient Medications   Medication Sig     COLLAGEN PO Take by mouth daily (Patient not taking: Reported on 3/14/2023)     Prenatal Vit-Fe Fumarate-FA (PRENATAL MULTIVITAMIN W/IRON) 27-0.8 MG tablet Take 1 tablet by mouth daily      triamcinolone (KENALOG) 0.1 % paste Apply to aphthous ulcer twice daily (Patient not taking: Reported on 3/30/2022)     No current facility-administered medications for this visit.     No Known Allergies      ROS: 10 point review of systems completed with no abnormals except as noted in HPI.      OBJECTIVE:     EXAM:  LMP 2023  There is no height or weight on file to calculate BMI.    GENERAL: healthy, alert and no distress  EYES: Eyes grossly normal to inspection, PERRL and conjunctivae and sclerae normal  NECK: no adenopathy, no asymmetry, masses, or scars and thyroid normal to palpation  RESP: lungs clear to auscultation - no rales, rhonchi or wheezes  CV: regular rate and rhythm, normal S1 S2, no S3 or S4, no murmur, click or rub, no peripheral edema and peripheral pulses strong  ABDOMEN: soft, nontender, no hepatosplenomegaly, no masses and bowel sounds normal   (female): normal female external genitalia, normal urethral meatus, vaginal mucosa pink, moist, well rugated, and normal cervix/adnexa/uterus without masses or discharge. Positive for trace amount of dark maroon discharge present in the vaginal canal.  MS: no gross musculoskeletal defects noted, no edema  SKIN: no suspicious lesions or rashes  NEURO: Normal strength and tone, mentation intact and speech normal  PSYCH: mentation appears normal, affect normal/bright  LYMPH: no cervical, supraclavicular, axillary, or inguinal adenopathy    ASSESSMENT/PLAN:     Jammie  is a  yo  at 12 weeks by US who presents today for her initial OB visit.    (Z34.90) Normal intrauterine pregnancy, antepartum  (primary encounter diagnosis)  Comment: Jammie is an extremely pleasant 32 year old  who presents today for her first OB visit. She has some mild nausea, she does reports 2 episodes of vaginal spotting of dark maroon colored discharge. Denies cramping. She also reports that at some point after first ultrasound she felt as though she was no longer  pregnant. She and her  are concerned about the status of this pregnancy and are requesting ultrasound. Review of initial OB US indicates fetal heart rate of 109. Plan to order pelvic US for evaluation of pregnancy status.  Her  is hepatitis B positive. They have been using condoms for intercourse except to achieve pregnancy. She would like to be vaccinated if she has not already received the Hepatitis B series. No record of completing the Hep B series in Epic. She and her  will forward a copy of her immunization records from immigration process to us for evaluation  Plan:    Chlamydia trachomatis PCR,    Neisseria gonorrhoeae PCR,    aspirin (ASA) 81 MG EC tablet, ASA prophylaxis to start at 12 weeks (May 3)   Prenatal Vit-Fe Fumarate-FA (PRENATAL MULTIVITAMIN W/IRON) 27-0.8 MG tablet,    US OB <14 Weeks w Transvaginal Single,    UA Macroscopicwith reflex to Microscopic and Culture,   PNV   New OB Labs were previously done. Plan to do STI screening and PAP today and recheck urine following treatment with antibiotic.   Discussed routine prenatal care, quad screen, GCT, anatomy scan at ~19 weeks, frequency of visits.   Discussed first trimester screen and she and her  will consider   Delivery hospital: [facility]   Follow up in 4 weeks for return OB visit.   Recommended weight gain for pregnancy: 25-35 lbs   She and  will check to see if she previously received her Hep B vaccine series. If not, she would like to receive the Hepatits B vaccines.    (Z12.4) Cervical cancer screening  Comment: No history of previous PAP.  Plan: Pap Screen with HPV - recommended age 30 - 65         Years    Options for  testing for chromosomal and birth defects were discussed with the patient. Diagnostic tests include CVS and Amniocentesis. We discussed that these tests are definitive but invasive and do carry a risk of fetal loss.   Screening tests include nuchal translucency/blood marker  testing in the first trimester and quad screening in the second trimester. We discussed that these are screening tests and not diagnostic tests and that false positives and negatives are a distinct possibility.     Discussed aspirin use in pregnancy.  Low-dose aspirin prophylaxis can be beneficial in women at high risk of developing preeclampsia.  I generally recommend we begin aspirin at about 12-13 weeks gestation and continue until at least 36 weeks.    Women with at least one of the following conditions are considered high risk for developing preeclampsia: Previous pregnancy with preeclampsia,  multifetal-gestation, chronic hypertension, diabetes mellitus, chronic kidney disease, autoimmune disease, and IVF.    Women with more than 1 of the following conditions may also consider low-dose aspirin prophylaxis in pregnancy: Nulliparity, BMI greater than 30, family history of preeclampsia (mother or sister), AMA, socio-demographic characteristics, personal risk factors.      Patient DOES meet the above criteria.  Discussed risks and benefits of low dose Asprin therapy and she elects to proceed.     45 minutes was spent face to face with the patient today discussing her history, diagnosis, and follow-up plan as noted above.        SOHAN Bacon CNP

## 2023-04-18 NOTE — PATIENT INSTRUCTIONS
If you have any questions regarding your visit, Please contact your care team.     [employer]: [phone]  To Schedule an Appointment 24/7  Call: [phone] HOU[facility] TELEPHONE NUMBER   Adelina Marinelli, JAMISON, APRN, NP-BC    Alcira -Surgery Scheduler  Edita - Surgery Scheduler    Carolyn RN  Khloe, RN  Ela, BEN        [facility]  [address] [place] [address] [phone] Phone  [phone] Fax    Imaging Scheduling-All Locations [phone]    [facility]  [address] [place] [address]     Urgent Care locations:  [facility] Monday-Friday   10 am - 8 pm  Saturday and Sunday   9 am - 5 pm [phone]   [facility] Labor and Delivery:  [phone]    If you need a medication refill, please contact your pharmacy. Please allow 3 business days for your refill to be completed.  As always, Thank you for trusting us with your healthcare needs!  see additional instructions from your care team below

## 2023-04-19 LAB
C TRACH DNA SPEC QL NAA+PROBE: NEGATIVE
N GONORRHOEA DNA SPEC QL NAA+PROBE: NEGATIVE

## 2023-04-20 ENCOUNTER — MYC MEDICAL ADVICE (OUTPATIENT)
Dept: OBGYN | Facility: CLINIC | Age: 33
End: 2023-04-20
Payer: COMMERCIAL

## 2023-04-20 LAB
BKR LAB AP GYN ADEQUACY: NORMAL
BKR LAB AP GYN INTERPRETATION: NORMAL
BKR LAB AP HPV REFLEX: NORMAL
BKR LAB AP PREVIOUS ABNORMAL: NORMAL
PATH REPORT.COMMENTS IMP SPEC: NORMAL
PATH REPORT.COMMENTS IMP SPEC: NORMAL
PATH REPORT.RELEVANT HX SPEC: NORMAL

## 2023-04-21 ENCOUNTER — NURSE TRIAGE (OUTPATIENT)
Dept: FAMILY MEDICINE | Facility: CLINIC | Age: 33
End: 2023-04-21
Payer: COMMERCIAL

## 2023-04-21 ENCOUNTER — TELEPHONE (OUTPATIENT)
Dept: OBGYN | Facility: CLINIC | Age: 33
End: 2023-04-21

## 2023-04-21 ENCOUNTER — HOSPITAL ENCOUNTER (OUTPATIENT)
Dept: ULTRASOUND IMAGING | Facility: HOSPITAL | Age: 33
Discharge: HOME | End: 2023-04-21
Payer: COMMERCIAL

## 2023-04-21 DIAGNOSIS — Z34.90 NORMAL INTRAUTERINE PREGNANCY, ANTEPARTUM: ICD-10-CM

## 2023-04-21 PROCEDURE — 76801 OB US < 14 WKS SINGLE FETUS: CPT

## 2023-04-21 NOTE — TELEPHONE ENCOUNTER
Spoke with pt's .    Pt had vaginal discharge last night that was reddish/black.  no bleeding or spotting today.  Pt is struggling with constipation, she has not had a BM in at least a week.    Denies abdominal pain or cramping.    No recent intercourse.  Denies vaginal or urinary symptoms.      Sub-chorionic hemorrhage seen on US on 3/22.  Pt's  states that Adelina Rae, SOHAN CNP, suggested pt have a f/u US and the future order is placed.    I suggested to  that the spotting she noticed is most likely from the constipation or the subchorionic hemorrhage..  I told him it is good that she has not noticed further spotting and she has no other concerning symptoms.    I advised increase fluids, increase fiber in diet and take a daily stool softener to start having regular bowel movements.  However, since it has been a week since no BM, I suggested a laxative or suppository x1 today.  I also suggested to schedule the f/u US that Adelina recommended for piece of mind.  I suggested to reach back out if symptoms worsened.    Pt's  would also like a [email or web address] message sent with above instructions.    Carolyn Thomas RN

## 2023-04-21 NOTE — TELEPHONE ENCOUNTER
Pt also sent in a [employer] message and I sent her back a message with further questions.  Will wait for pt's Transfer Tohart responses as she has read my message to see if pt truly needs to be seen in clinic today.    Carolyn Thomas RN

## 2023-04-21 NOTE — TELEPHONE ENCOUNTER
"2nd level triage -yes protocol states see in office today    Situation: priority transfer - 10 week pregnant patient spotting and passed \"mucous\" last night 4/20     is calling with wife     Background:    New Prenatal with SOHAN Cross CNP, on 4/18/23.  Pt's FOB is Hep B positive, pt is Hep B negative at this time.- Anna Santiago       10 weeks pregnant-1st pregnancy  No history of miscarriage,  Bleeding or clotting disorders   See 4/18 mychart messages     Assessment:     describes bleeding as 2 quarter size drops of dark almost black colored discharge and light red   States with discharge there was  a dime size \"mucous ball\"   \"really dark not sure if it was blood or what the discharge was\" last night around 9pm. patient denied feel any pain or cramping.     Mild to moderate lower back pain that started 2 days ago   No abdominal pain or cramping    No fever   No dizziness     Recommendation:  Advised will send over the provider and staff will call back with providers recommendations.   Advised heavy bleeding  And or severe cramping, red flag symptoms -go to ER   Routed- as a high priority to Anna santiago team and DAKSHA Rae     Patient and . denied any other questions or concerns and agrees with plan.       Lynn ZAMARRIPA RN   [facility] Triage       Reason for Disposition    Patient wants to be seen    Additional Information    Negative: Shock suspected (e.g., cold/pale/clammy skin, too weak to stand, low BP, rapid pulse)    Negative: Difficult to awaken or acting confused (e.g., disoriented, slurred speech)    Negative: Passed out (i.e., fainted, collapsed and was not responding)    Negative: Sounds like a life-threatening emergency to the triager    Negative: Vaginal bleeding and pregnant 20 or more weeks    Negative: Not pregnant or pregnancy status unknown    Negative: SEVERE abdominal pain (e.g., excruciating)    Negative: SEVERE vaginal bleeding (e.g., " soaking 2 pads or tampons per hour and present 2 or more hours; 1 menstrual cup every 2 hours)    Negative: SEVERE dizziness (e.g., unable to stand, requires support to walk, feels like passing out)    Negative: MODERATE vaginal bleeding (i.e., soaking 1 pad) and present > 6 hours    Negative: MODERATE vaginal bleeding (i.e., soaking 1 pad / hour, clots) and pregnant > 12 weeks    Negative: Shoulder pain    Negative: Constant abdominal pain lasting > 1 hour    Negative: Fever > 100.4 F (38.0 C)    Negative: Pale skin (pallor) of new-onset or worsening    Negative: Patient sounds very sick or weak to the triager    Negative: Intermittent lower abdominal pain (e.g., cramping) lasting > 24 hours    Negative: MODERATE vaginal bleeding (i.e., soaking 1 pad / hour; clots)    Negative: Pain or burning with passing urine (urination)    Negative: Prior history of 'ectopic pregnancy' or previous tubal surgery (e.g., tubal ligation)    Commented on: Passed tissue (e.g., gray-white)     Mucous    Protocols used: PREGNANCY - VAGINAL BLEEDING LESS THAN 20 WEEKS EGA-A-OH

## 2023-04-21 NOTE — TELEPHONE ENCOUNTER
Radiology called to report that ultrasound indicated that loss of pregnancy has likely occurred with no fetal cardiac activity and a gestational sac of 8 weeks 2 days when expected to be 10 weeks 2 days based on previous US.  Called patient's  Shelton and relayed this information to him since Jammie is not a fluent english speaker.  They are appropriately saddened by this news.  They report that Jammie has had continued dark bloody vaginal spotting.  We discussed management options including expectant management, mediation management and the option for surgical management.  At this time they have chosen to proceed with expectant management.  We discussed bleeding precautions and when to be seen urgently.  Questions answered, reassured that this loss is no fault of theirs.   Plan to follow-up with patient in 1-2 weeks.  SOHAN Bacon CNP

## 2023-04-24 ENCOUNTER — MYC MEDICAL ADVICE (OUTPATIENT)
Dept: OBGYN | Facility: CLINIC | Age: 33
End: 2023-04-24
Payer: COMMERCIAL

## 2023-04-24 ENCOUNTER — TELEPHONE (OUTPATIENT)
Dept: OBGYN | Facility: CLINIC | Age: 33
End: 2023-04-24
Payer: COMMERCIAL

## 2023-04-24 DIAGNOSIS — O03.9 SPONTANEOUS ABORTION: Primary | ICD-10-CM

## 2023-04-24 LAB
HUMAN PAPILLOMA VIRUS 16 DNA: NEGATIVE
HUMAN PAPILLOMA VIRUS 18 DNA: NEGATIVE
HUMAN PAPILLOMA VIRUS FINAL DIAGNOSIS: NORMAL
HUMAN PAPILLOMA VIRUS OTHER HR: NEGATIVE

## 2023-04-24 NOTE — TELEPHONE ENCOUNTER
Spoke with Jammie's  Shelton. He reports patient passed a large amount of tissue on Sunday and had significant bleeding. He took her to the ER ([facility]). He reports she had imaging done. Images are not currently available. He states Jammie's bleeding is light, she continues to cramp. We discussed use of Ibuprofen for management of cramping pain. Reviewed expectations of post-loss bleeding.  Plan made for following HCG quant values and Shelton would like to have an US repeated later this week or early next week.  I will call Shelton to discuss results after these are available.  Adelina Rae, SOHAN CNP

## 2023-04-24 NOTE — TELEPHONE ENCOUNTER
Pt's  calling to inform team wife had a miscarriage over the weekend. Was instructed to call provider. Please follow up as they are not sure if she needs to come in or anything. Call  Raudel. Ok to leave a detailed message.

## 2023-04-27 NOTE — TELEPHONE ENCOUNTER
"Contacted Shelton, Jammie's  to follow-up after miscarriage last week. He states she is feeling much better and that her bleeding is now light and she has described it more as a \"regular period.\" He reports that they had the baby's remains cremated and they have the remains at their house. He tells me this has given them comfort and peace and has been an important part of their healing process.  Shelton also requests the follow-up US just for peace of mind that all contents of this pregnancy have passed. Plan that Jammie will also have an HCG quant level drawn at time of US. I will follow-up when these results are available.  Adelina Rae, APRN CNP    "

## 2023-05-01 ENCOUNTER — LAB (OUTPATIENT)
Dept: LAB | Facility: CLINIC | Age: 33
End: 2023-05-01
Payer: COMMERCIAL

## 2023-05-01 DIAGNOSIS — O03.9 SPONTANEOUS ABORTION: ICD-10-CM

## 2023-05-01 LAB — B-HCG SERPL-ACNC: 208 IU/L (ref 0–5)

## 2023-05-01 PROCEDURE — 84702 CHORIONIC GONADOTROPIN TEST: CPT

## 2023-05-01 PROCEDURE — 36415 COLL VENOUS BLD VENIPUNCTURE: CPT

## 2023-05-02 ENCOUNTER — MYC MEDICAL ADVICE (OUTPATIENT)
Dept: OBGYN | Facility: CLINIC | Age: 33
End: 2023-05-02
Payer: COMMERCIAL

## 2023-05-04 ENCOUNTER — MYC MEDICAL ADVICE (OUTPATIENT)
Dept: OBGYN | Facility: CLINIC | Age: 33
End: 2023-05-04

## 2023-05-04 ENCOUNTER — ANCILLARY PROCEDURE (OUTPATIENT)
Dept: ULTRASOUND IMAGING | Facility: CLINIC | Age: 33
End: 2023-05-04
Payer: COMMERCIAL

## 2023-05-04 ENCOUNTER — LAB (OUTPATIENT)
Dept: LAB | Facility: CLINIC | Age: 33
End: 2023-05-04
Payer: COMMERCIAL

## 2023-05-04 DIAGNOSIS — O03.9 SPONTANEOUS ABORTION: ICD-10-CM

## 2023-05-04 LAB — B-HCG SERPL-ACNC: 79 IU/L (ref 0–5)

## 2023-05-04 PROCEDURE — 76830 TRANSVAGINAL US NON-OB: CPT | Mod: TC | Performed by: RADIOLOGY

## 2023-05-04 PROCEDURE — 84702 CHORIONIC GONADOTROPIN TEST: CPT

## 2023-05-04 PROCEDURE — 36415 COLL VENOUS BLD VENIPUNCTURE: CPT

## 2023-05-04 PROCEDURE — 76856 US EXAM PELVIC COMPLETE: CPT | Mod: TC | Performed by: RADIOLOGY

## 2023-05-08 ENCOUNTER — MYC MEDICAL ADVICE (OUTPATIENT)
Dept: OBGYN | Facility: CLINIC | Age: 33
End: 2023-05-08
Payer: COMMERCIAL

## 2023-05-12 ENCOUNTER — LAB (OUTPATIENT)
Dept: LAB | Facility: CLINIC | Age: 33
End: 2023-05-12
Payer: COMMERCIAL

## 2023-05-12 DIAGNOSIS — O03.9 SPONTANEOUS ABORTION: ICD-10-CM

## 2023-05-12 LAB — HCG INTACT+B SERPL-ACNC: 18 MIU/ML

## 2023-05-12 PROCEDURE — 84702 CHORIONIC GONADOTROPIN TEST: CPT

## 2023-05-12 PROCEDURE — 36415 COLL VENOUS BLD VENIPUNCTURE: CPT

## 2023-05-12 NOTE — TELEPHONE ENCOUNTER
HCG Quant value is 18 following 4/23/2023 miscarriage.   Quant values are trending down.  Component      Latest Ref Rng 5/1/2023  9:34 AM 5/4/2023  8:15 AM 5/12/2023  9:57 AM   HCG Quantitative Serum      0 - 5 IU/L 208 (H)  79 (H)     hCG Quantitative      <5 mIU/mL   18 (H)       Contacted Jammie's  Shelton. He reports Jammie is feeling well, she has no bleeding and no pain.  Plan made to repeat HCG quant in 1 week. We discussed the previous US showing echogenic nodular structure in the endometrium which could represent either blood products or POC.  Plan that if HCG quant has not dropped below 5 by next week we will consider alternative plan, including the possible option of hysteroscopy procedure.  Shelton states understanding, denies questions.  Adelina Rae, SOHAN CNP

## 2023-05-17 ENCOUNTER — LAB (OUTPATIENT)
Dept: LAB | Facility: CLINIC | Age: 33
End: 2023-05-17
Payer: COMMERCIAL

## 2023-05-17 DIAGNOSIS — O03.9 SPONTANEOUS ABORTION: ICD-10-CM

## 2023-05-17 LAB — HCG INTACT+B SERPL-ACNC: 9 MIU/ML

## 2023-05-17 PROCEDURE — 36415 COLL VENOUS BLD VENIPUNCTURE: CPT

## 2023-05-17 PROCEDURE — 84702 CHORIONIC GONADOTROPIN TEST: CPT

## 2023-05-19 NOTE — TELEPHONE ENCOUNTER
"Contacted patient and her  Shelton to discuss her most recent HCG and her status. They report she feels \"great.\" They state no pelvic pain or cramping and no vaginal bleeding. HCG continues to trend down. Plan next HCG for 2 weeks from now.They are encouraged to reach out with any questions or concerns.  Adelina Rae, SOHAN CNP    "

## 2023-06-04 ENCOUNTER — HEALTH MAINTENANCE LETTER (OUTPATIENT)
Age: 33
End: 2023-06-04

## 2023-06-11 ENCOUNTER — LAB (OUTPATIENT)
Dept: LAB | Facility: CLINIC | Age: 33
End: 2023-06-11
Payer: COMMERCIAL

## 2023-06-11 DIAGNOSIS — O03.9 SPONTANEOUS ABORTION: ICD-10-CM

## 2023-06-11 LAB — HCG INTACT+B SERPL-ACNC: 1 MIU/ML

## 2023-06-11 PROCEDURE — 36415 COLL VENOUS BLD VENIPUNCTURE: CPT

## 2023-06-11 PROCEDURE — 84702 CHORIONIC GONADOTROPIN TEST: CPT

## 2023-06-14 ENCOUNTER — MYC MEDICAL ADVICE (OUTPATIENT)
Dept: OBGYN | Facility: CLINIC | Age: 33
End: 2023-06-14
Payer: COMMERCIAL

## 2023-06-14 NOTE — TELEPHONE ENCOUNTER
Pt had a recent miscarriage.  Did have a negative HCG quant on 6/11/23.      Pt is asking if there is anything her or her  should be doing prior to attempting another pregnancy.

## 2023-08-09 ENCOUNTER — VIRTUAL VISIT (OUTPATIENT)
Dept: OBGYN | Facility: CLINIC | Age: 33
End: 2023-08-09
Payer: COMMERCIAL

## 2023-08-09 VITALS — HEIGHT: 60 IN | BODY MASS INDEX: 21.44 KG/M2

## 2023-08-09 DIAGNOSIS — Z34.80 PRENATAL CARE, SUBSEQUENT PREGNANCY, UNSPECIFIED TRIMESTER: Primary | ICD-10-CM

## 2023-08-09 PROCEDURE — 99207 PR NO CHARGE NURSE ONLY: CPT

## 2023-08-09 NOTE — PROGRESS NOTES
Important Information for Provider: patient has concerns due to previous miscarriage.  is positive for hepatitis B and also type 2 diabetes.     Telephone visit with patient for New Prenatal Intake and Education. This is patient's 2nd pregnancy. Handouts reviewed and will be provided at next prenatal appointment. Scheduled for New Prenatal with Dr Snyder on 8/23/23.        Prenatal OB Questionnaire  Patient supplied answers from flow sheet for:  Prenatal OB Questionnaire.  Past Medical History  Have you ever recieved care for your mental health? : No  Have you ever been in a major accident or suffered serious trauma?: No  Within the last year, has anyone hit, slapped, kicked or otherwise hurt you?: No  In the last year, has anyone forced you to have sex when you didn't want to?: No    Past Medical History 2   Have you ever received a blood transfusion?: No  Would you accept a blood transfusion if was medically recommended?: Yes  Does anyone in your home smoke?: No   Is your blood type Rh negative?: No  Have you ever ?: No  Have you been hospitalized for a nonsurgical reason excluding normal delivery?: (!) Yes (miscarriage)  Have you ever had an abnormal pap smear?: No    Past Medical History (Continued)  Do you have a history of abnormalities of the uterus?: No  Did your mother take TROY or any other hormones when she was pregnant with you?: No  Do you have any other problems we have not asked about which you feel may be important to this pregnancy?: (!) Yes (previous miscarriage)                     Allergies as of 8/9/2023:    Allergies as of 08/09/2023    (No Known Allergies)       Current medications are:  Current Outpatient Medications   Medication Sig Dispense Refill    Prenatal Vit-Fe Fumarate-FA (PRENATAL MULTIVITAMIN W/IRON) 27-0.8 MG tablet Take 1 tablet by mouth daily      aspirin (ASA) 81 MG EC tablet Take 1 tablet (81 mg) by mouth daily (Patient not taking: Reported on 8/9/2023) 90  tablet 2    COLLAGEN PO Take by mouth daily (Patient not taking: Reported on 3/14/2023)      Prenatal Vit-Fe Fumarate-FA (PRENATAL MULTIVITAMIN W/IRON) 27-0.8 MG tablet Take 1 tablet by mouth daily (Patient not taking: Reported on 8/9/2023) 90 tablet 3    triamcinolone (KENALOG) 0.1 % paste Apply to aphthous ulcer twice daily (Patient not taking: Reported on 3/30/2022) 15 g 0         Early ultrasound screening tool:    Does patient have irregular periods?  No  Did patient use hormonal birth control in the three months prior to positive urine pregnancy test? No  Is the patient breastfeeding?  No  Is the patient 10 weeks or greater at time of education visit?  No    PHQ-2 Score:         8/9/2023     9:09 AM 4/18/2023     8:57 AM   PHQ-2 ( 1999 [employer])   Q1: Little interest or pleasure in doing things 0 0   Q2: Feeling down, depressed or hopeless 0 0   PHQ-2 Score 0 0   Q1: Little interest or pleasure in doing things  Not at all   Q2: Feeling down, depressed or hopeless  Not at all   PHQ-2 Score  0     Viky Clinton LPN

## 2023-08-16 ENCOUNTER — ANCILLARY PROCEDURE (OUTPATIENT)
Dept: ULTRASOUND IMAGING | Facility: CLINIC | Age: 33
End: 2023-08-16
Attending: OBSTETRICS & GYNECOLOGY
Payer: COMMERCIAL

## 2023-08-16 PROCEDURE — 76801 OB US < 14 WKS SINGLE FETUS: CPT | Mod: TC | Performed by: RADIOLOGY

## 2023-08-17 ENCOUNTER — TELEPHONE (OUTPATIENT)
Dept: OBGYN | Facility: CLINIC | Age: 33
End: 2023-08-17
Payer: COMMERCIAL

## 2023-08-17 NOTE — TELEPHONE ENCOUNTER
Pt currently 9w4d, last seen for prenatal intake on 8/9/2023. RN spoke with pt's - consent is on file.    Pt's  states the following:    Pt having cold symptoms since this AM, sore throat, pt has slight fever- unsure temp but will check. Pt has some chills. Pt denies lightheadedness or weakness.    Pt has had nausea, denies vomiting. Denies chest pain, SOB, or difficulty breathing.    RN gave ED precautions for high temp, chest pain, SOB. RN advised to be seen with FP or UC if symptoms are not improving with home management recommendations sent via [facility].    Patient's  verbalized understanding and agreed to plan.       Ela Araya RN on 8/17/2023 at 11:26 AM

## 2023-08-22 LAB
ABO/RH(D): NORMAL
ANTIBODY SCREEN: NEGATIVE
SPECIMEN EXPIRATION DATE: NORMAL

## 2023-08-23 ENCOUNTER — PRENATAL OFFICE VISIT (OUTPATIENT)
Dept: OBGYN | Facility: CLINIC | Age: 33
End: 2023-08-23
Payer: COMMERCIAL

## 2023-08-23 VITALS
DIASTOLIC BLOOD PRESSURE: 62 MMHG | WEIGHT: 108.4 LBS | SYSTOLIC BLOOD PRESSURE: 93 MMHG | BODY MASS INDEX: 21.17 KG/M2 | OXYGEN SATURATION: 98 % | HEART RATE: 77 BPM

## 2023-08-23 DIAGNOSIS — Z34.80 PRENATAL CARE, SUBSEQUENT PREGNANCY, UNSPECIFIED TRIMESTER: ICD-10-CM

## 2023-08-23 DIAGNOSIS — N89.8 VAGINAL DISCHARGE: ICD-10-CM

## 2023-08-23 DIAGNOSIS — O09.291 CURRENT PREGNANCY IN FIRST TRIMESTER WITH HISTORY OF SPONTANEOUS ABORTION DURING PRIOR PREGNANCY: Primary | ICD-10-CM

## 2023-08-23 LAB
ALBUMIN UR-MCNC: NEGATIVE MG/DL
APPEARANCE UR: CLEAR
BACTERIA #/AREA URNS HPF: ABNORMAL /HPF
BILIRUB UR QL STRIP: NEGATIVE
CLUE CELLS: ABNORMAL
COLOR UR AUTO: YELLOW
ERYTHROCYTE [DISTWIDTH] IN BLOOD BY AUTOMATED COUNT: 11.7 % (ref 10–15)
GLUCOSE UR STRIP-MCNC: NEGATIVE MG/DL
HCT VFR BLD AUTO: 34.1 % (ref 35–47)
HGB BLD-MCNC: 11.7 G/DL (ref 11.7–15.7)
HGB UR QL STRIP: ABNORMAL
KETONES UR STRIP-MCNC: NEGATIVE MG/DL
LEUKOCYTE ESTERASE UR QL STRIP: NEGATIVE
MCH RBC QN AUTO: 29 PG (ref 26.5–33)
MCHC RBC AUTO-ENTMCNC: 34.3 G/DL (ref 31.5–36.5)
MCV RBC AUTO: 85 FL (ref 78–100)
NITRATE UR QL: NEGATIVE
PH UR STRIP: 7 [PH] (ref 5–7)
PLATELET # BLD AUTO: 225 10E3/UL (ref 150–450)
RBC # BLD AUTO: 4.03 10E6/UL (ref 3.8–5.2)
RBC #/AREA URNS AUTO: ABNORMAL /HPF
SP GR UR STRIP: 1.02 (ref 1–1.03)
SQUAMOUS #/AREA URNS AUTO: ABNORMAL /LPF
T PALLIDUM AB SER QL: NONREACTIVE
TRICHOMONAS, WET PREP: ABNORMAL
UROBILINOGEN UR STRIP-ACNC: 0.2 E.U./DL
WBC # BLD AUTO: 7.4 10E3/UL (ref 4–11)
WBC #/AREA URNS AUTO: ABNORMAL /HPF
WBC'S/HIGH POWER FIELD, WET PREP: ABNORMAL
YEAST, WET PREP: ABNORMAL

## 2023-08-23 PROCEDURE — 85027 COMPLETE CBC AUTOMATED: CPT | Performed by: OBSTETRICS & GYNECOLOGY

## 2023-08-23 PROCEDURE — 36415 COLL VENOUS BLD VENIPUNCTURE: CPT | Performed by: OBSTETRICS & GYNECOLOGY

## 2023-08-23 PROCEDURE — 86901 BLOOD TYPING SEROLOGIC RH(D): CPT | Performed by: OBSTETRICS & GYNECOLOGY

## 2023-08-23 PROCEDURE — 87340 HEPATITIS B SURFACE AG IA: CPT | Performed by: OBSTETRICS & GYNECOLOGY

## 2023-08-23 PROCEDURE — 87389 HIV-1 AG W/HIV-1&-2 AB AG IA: CPT | Performed by: OBSTETRICS & GYNECOLOGY

## 2023-08-23 PROCEDURE — 99213 OFFICE O/P EST LOW 20 MIN: CPT | Performed by: OBSTETRICS & GYNECOLOGY

## 2023-08-23 PROCEDURE — 86803 HEPATITIS C AB TEST: CPT | Performed by: OBSTETRICS & GYNECOLOGY

## 2023-08-23 PROCEDURE — 81001 URINALYSIS AUTO W/SCOPE: CPT | Performed by: OBSTETRICS & GYNECOLOGY

## 2023-08-23 PROCEDURE — 86780 TREPONEMA PALLIDUM: CPT | Performed by: OBSTETRICS & GYNECOLOGY

## 2023-08-23 PROCEDURE — 87210 SMEAR WET MOUNT SALINE/INK: CPT | Performed by: OBSTETRICS & GYNECOLOGY

## 2023-08-23 PROCEDURE — 86850 RBC ANTIBODY SCREEN: CPT | Performed by: OBSTETRICS & GYNECOLOGY

## 2023-08-23 PROCEDURE — 87086 URINE CULTURE/COLONY COUNT: CPT | Performed by: OBSTETRICS & GYNECOLOGY

## 2023-08-23 PROCEDURE — 86900 BLOOD TYPING SEROLOGIC ABO: CPT | Performed by: OBSTETRICS & GYNECOLOGY

## 2023-08-23 PROCEDURE — 86762 RUBELLA ANTIBODY: CPT | Performed by: OBSTETRICS & GYNECOLOGY

## 2023-08-23 RX ORDER — CALCIUM POLYCARBOPHIL 625 MG
TABLET ORAL DAILY
COMMUNITY

## 2023-08-23 RX ORDER — CALCIUM CARBONATE 750 MG/1
750 TABLET, CHEWABLE ORAL DAILY
COMMUNITY

## 2023-08-23 RX ORDER — MULTIVIT-MIN/IRON/FOLIC ACID/K 18-600-40
CAPSULE ORAL
COMMUNITY

## 2023-08-23 RX ORDER — ASPIRIN 81 MG/1
81 TABLET, CHEWABLE ORAL DAILY
Qty: 90 TABLET | Refills: 3 | Status: SHIPPED | OUTPATIENT
Start: 2023-08-23

## 2023-08-23 NOTE — PROGRESS NOTES
INITIAL OB ASSESSMENT............................................Date: 2023                            ---------------------    Name: Jammie Lopes.......................................................................Plan Number: [id number]    Age: 33 year old   : 1990  Phone: [phone] (home)   Address: [address] [place] [address] [address] [facility] [address]    Marital Status:    Race/Ethnicity:   Occupation/Employer:  [employer]   Partner's Name:  Raudel Lopes, Partner's Occupation:  [employer]     OB Physician: Nicko Snyder MD       LMP:  Patient's LMP from OB Dating Form:  Patient's last menstrual period was 2023.  Regular menses? yes  Menses every month.   Length of menses: 5 days     OB < 14 WEEKS SINGLE-TRANSABDOMINAL 2023 9:17 AM  CLINICAL HISTORY: dating/ viability; Prenatal care, subsequent  pregnancy, unspecified trimester  TECHNIQUE: Transabdominal scans were performed. Endovaginal ultrasound  was performed to better visualize the embryo.  COMPARISON: None.  FINDINGS:  UTERUS: Single normal appearing intrauterine gestation sac.  CRL: measures 23 mm, equals 9 weeks 1 day.  FETAL HEART RATE: 167 bpm.  AMNIOTIC FLUID: Normal.  PLACENTA: Not yet formed. Small subchorionic hemorrhage adjacent to  the gestational sac measuring 1.5 x 0.9 cm..  RIGHT OVARY: Normal.  LEFT OVARY: Normal.                                                           IMPRESSION:   1.  Single living intrauterine gestation at 9 weeks 1 day, EDC 2023.      Obstetrical History  ===================  OB History    Para Term  AB Living   2 0 0 0 1 0   SAB IAB Ectopic Multiple Live Births   1 0 0 0 0      # Outcome Date GA Lbr Kwabena/2nd Weight Sex Delivery Anes PTL Lv   2 Current            1 SAB 23 10w4d       FD       Past Medical History:  Past Medical History:   Diagnosis Date    History of miscarriage        Past Surgical History:  Past Surgical  History:   Procedure Laterality Date    NO HISTORY OF SURGERY         Current Outpatient Medications   Medication Sig Dispense Refill    Ascorbic Acid (VITAMIN C) 500 MG CAPS       calcium carbonate 750 MG CHEW Take 750 mg by mouth daily      Calcium Polycarbophil (FIBER) 625 MG tablet Take by mouth daily      Prenatal Vit-Fe Fumarate-FA (PRENATAL MULTIVITAMIN W/IRON) 27-0.8 MG tablet Take 1 tablet by mouth daily 90 tablet 3       No Known Allergies    Social History     Socioeconomic History    Marital status:      Spouse name: Not on file    Number of children: Not on file    Years of education: Not on file    Highest education level: Not on file   Occupational History    Not on file   Tobacco Use    Smoking status: Never    Smokeless tobacco: Never   Vaping Use    Vaping Use: Never used   Substance and Sexual Activity    Alcohol use: Not Currently    Drug use: Never    Sexual activity: Yes     Partners: Male   Other Topics Concern    Not on file   Social History Narrative    Not on file     Social Determinants of Health     Financial Resource Strain: Not on file   Food Insecurity: Not on file   Transportation Needs: Not on file   Physical Activity: Not on file   Stress: Not on file   Social Connections: Not on file   Intimate Partner Violence: Not on file   Housing Stability: Not on file       No substance abuse, environmental exposures, mental health risks and No financial concerns, or pets. Good Partner support.       Family History   Problem Relation Age of Onset    Heart Disease Mother     Diabetes Mother     Cirrhosis Mother     Arthritis Father     Breast Cancer Sister 30       Past Medical History of Father of Baby:   Her  has diabetes.      No known genetic disease in patient's 1st or 2nd degree relatives  No known genetic diseases in the FOB's 1st or 2nd degree relatives      REVIEW OF SYMPTOMS:   History Since Last Menstrual Period:    None        PHYSICAL EXAM:  BP 93/62 (BP  Location: Right arm, Cuff Size: Adult Regular)   Pulse 77   Wt 49.2 kg (108 lb 6.4 oz)   LMP 2023   SpO2 98%   BMI 21.17 kg/m    General:  WNWD female, NAD  Oriented:  X 3  Alert  PSYCH:  mentation appears normal., affect and mood normal  HEENT:  NC/AT, EOMI  NECK:  Neck supple. No adenopathy. Thyroid symmetric, normal size,, Carotids without bruits.  HEART:  RRR  LUNGS:  Clear to auscultation.  Good respiratory effort  BREASTS: Declined   ABDOMEN: Benign, Soft, flat, non-tender, No masses, organomegaly, No inguinal nodes, and Bowel sounds normoactive FHT's heard, 145  PELVIC EXAM:  Not performed   EXTREMITIES:No cyanosis, clubbing, warm and well perfused and No edema   GAIT: normal including tandem walk, heel and toe walk.        Assessment:   IUP at 10.3 weeks   Pregnancy with history of miscarriage   Vaginal discharge      Plan:  Options for  testing for chromosomal and birth defects were discussed with the patient.  Diagnostic tests include CVS and Amniocentesis.  We discussed that these tests are definitive but invasive and do carry a risk of fetal loss.    Screening tests include nuchal translucency/blood marker testing in the first trimester and quad screening in the second trimester.  We discussed that these are screening tests and not diagnostic tests and that false positives and negatives are a distinct possibility.  The patient will discuss with her  and decide .  We discussed physician coverage, tertiary support, diet, exercise, weight gain, schedule of visits, routine and indicated ultrasounds, and childbirth education.   Discussed aspirin use in pregnancy.  Low-dose aspirin prophylaxis can be beneficial in women at high risk of developing preeclampsia.  I generally recommend we begin aspirin at about 12-13 weeks gestation and continue until at least 36 weeks.  Women with at least one of the following conditions are considered high risk for developing preeclampsia: Previous  pregnancy with preeclampsia,  multifetal-gestation, chronic hypertension, diabetes mellitus, chronic kidney disease, autoimmune disease.  Women with more than 1 of the following conditions may also consider low-dose aspirin prophylaxis in pregnancy: Nulliparity, BMI greater than 30, family history of preeclampsia (mother or sister), AMA, socio-demographic characteristics, personal risk factors.    Patient does meet the above criteria.  Discussed risks and benefits of low dose Asprin therapy and she elects to proceed.   Labs done today, wet prep ordered.   RTC 4 weeks

## 2023-08-24 LAB
BACTERIA UR CULT: NO GROWTH
HBV SURFACE AG SERPL QL IA: NONREACTIVE
HCV AB SERPL QL IA: NONREACTIVE
HIV 1+2 AB+HIV1 P24 AG SERPL QL IA: NONREACTIVE
RUBV IGG SERPL QL IA: 8.37 INDEX
RUBV IGG SERPL QL IA: POSITIVE

## 2023-09-01 ENCOUNTER — TELEPHONE (OUTPATIENT)
Dept: OBGYN | Facility: CLINIC | Age: 33
End: 2023-09-01
Payer: COMMERCIAL

## 2023-09-01 ENCOUNTER — MYC MEDICAL ADVICE (OUTPATIENT)
Dept: OBGYN | Facility: CLINIC | Age: 33
End: 2023-09-01
Payer: COMMERCIAL

## 2023-09-01 NOTE — TELEPHONE ENCOUNTER
" Per visit on 8/23/2023: \"Screening tests include nuchal translucency/blood marker testing in the first trimester and quad screening in the second trimester.  We discussed that these are screening tests and not diagnostic tests and that false positives and negatives are a distinct possibility.  The patient will discuss with her  and decide . \"  "

## 2023-09-01 NOTE — TELEPHONE ENCOUNTER
Order/Referral Request    Who is requesting: Patient and spouse    Orders being requested: 1st trimester screening     Reason service is needed/diagnosis: screen for Down syndrome     When are orders needed by: Dr. Snyder    Has this been discussed with Provider: Yes    Does patient have a preference on a Group/Provider/Facility? no    Does patient have an appointment scheduled?: No    Where to send orders: Place orders within Epic    Could we send this information to you in [facility] or would you prefer to receive a phone call?:   Patient would prefer a phone call   Okay to leave a detailed message?: Yes at Other phone number:  [phone]

## 2023-09-01 NOTE — TELEPHONE ENCOUNTER
I called patient and she was not able to hear me.   I called her  (as note requested).   At her gestational age,she is too late for starting this.  The NIPT and the quad test are reviewed.  These may be ordered at her next visit.  I suggest to talk with insurance to determine what the co-pay might be.  The NIPT requires signitures of patient and myself.    They will talk over further and decide.    Nicko Snyder MD

## 2023-09-05 NOTE — TELEPHONE ENCOUNTER
Per 9/1 TE:  Nicko Snyder MD     CB    9/1/23 12:46 PM  Note  I called patient and she was not able to hear me.   I called her  (as note requested).   At her gestational age,she is too late for starting this.  The NIPT and the quad test are reviewed.  These may be ordered at her next visit.  I suggest to talk with insurance to determine what the co-pay might be.  The NIPT requires signitures of patient and myself.    They will talk over further and decide.    Nicko Snyder MD

## 2023-09-08 ENCOUNTER — MYC MEDICAL ADVICE (OUTPATIENT)
Dept: OBGYN | Facility: CLINIC | Age: 33
End: 2023-09-08
Payer: COMMERCIAL

## 2023-09-20 ENCOUNTER — PRENATAL OFFICE VISIT (OUTPATIENT)
Dept: OBGYN | Facility: CLINIC | Age: 33
End: 2023-09-20
Payer: COMMERCIAL

## 2023-09-20 VITALS
BODY MASS INDEX: 21.21 KG/M2 | SYSTOLIC BLOOD PRESSURE: 95 MMHG | DIASTOLIC BLOOD PRESSURE: 61 MMHG | HEART RATE: 78 BPM | WEIGHT: 108.6 LBS | OXYGEN SATURATION: 100 %

## 2023-09-20 DIAGNOSIS — O09.291 CURRENT PREGNANCY IN FIRST TRIMESTER WITH HISTORY OF SPONTANEOUS ABORTION DURING PRIOR PREGNANCY: Primary | ICD-10-CM

## 2023-09-20 PROCEDURE — 99207 PR PRENATAL VISIT: CPT | Performed by: OBSTETRICS & GYNECOLOGY

## 2023-09-20 NOTE — PROGRESS NOTES
14w3d  BP 95/61 (BP Location: Right arm, Cuff Size: Adult Regular)   Pulse 78   Wt 49.3 kg (108 lb 9.6 oz)   LMP 06/11/2023   SpO2 100%   BMI 21.21 kg/m    She has had head aches for about 2 weeks.  Worse in the evening and in the morning she feels better.  She has not used any Tylenol for them.  It is ok to use Tylenol and follow directions on the container.    They will continue to discuss the  genetic testing available.   RTC 4 weeks.   Nicko Snyder MD

## 2023-09-26 ENCOUNTER — NURSE TRIAGE (OUTPATIENT)
Dept: NURSING | Facility: CLINIC | Age: 33
End: 2023-09-26
Payer: COMMERCIAL

## 2023-09-26 NOTE — TELEPHONE ENCOUNTER
"Call received from spouse, Raudel  Consent to Communicate on Chart    Thi is 15w2d pregnant    ~4:30 pm - At work today  - Nauseated  - Dizzy - felt like she was going to pass out  - HA  - SOB  - Lasted about 10 minutes    Still feels \"exhausted\"  - Still feeling dizzy - able to stand and walk on her own  - Slight HA    Denies fever, vision changes    Had Covid about 1 month ago  No other recent symptoms of illness    ER advised   They will go to either [employer] or [employer], (vs [employer])    Chary Valiente RN  [facility]      Reason for Disposition   Patient sounds very sick or weak to the triager    Additional Information   Negative: SEVERE difficulty breathing (e.g., struggling for each breath, speaks in single words)   Negative: [1] Difficulty breathing or swallowing AND [2] started suddenly after medicine, an allergic food or bee sting   Negative: Shock suspected (e.g., cold/pale/clammy skin, too weak to stand, low BP, rapid pulse)   Negative: Difficult to awaken or acting confused (e.g., disoriented, slurred speech)   Negative: [1] Weakness (i.e., paralysis, loss of muscle strength) of the face, arm or leg on one side of the body AND [2] sudden onset AND [3] present now   Negative: [1] Numbness (i.e., loss of sensation) of the face, arm or leg on one side of the body AND [2] sudden onset AND [3] present now   Negative: [1] Loss of speech or garbled speech AND [2] sudden onset AND [3] present now   Negative: Overdose (accidental or intentional) of medications   Negative: [1] Fainted > 15 minutes ago AND [2] still feels too weak or dizzy to stand   Negative: Heart beating < 50 beats per minute OR > 140 beats per minute   Negative: Sounds like a life-threatening emergency to the triager   Negative: Difficulty breathing   Negative: SEVERE dizziness (e.g., unable to stand, requires support to walk, feels like passing out now)   Negative: Extra heartbeats, irregular heart beating, or heart is " "beating very fast  (i.e., \"palpitations\")   Negative: [1] Drinking very little AND [2] dehydration suspected (e.g., no urine > 12 hours, very dry mouth, very lightheaded)   Negative: [1] Weakness (i.e., paralysis, loss of muscle strength) of the face, arm / hand, or leg / foot on one side of the body AND [2] sudden onset AND [3] brief (now gone)   Negative: [1] Numbness (i.e., loss of sensation) of the face, arm / hand, or leg / foot on one side of the body AND [2] sudden onset AND [3] brief (now gone)   Negative: [1] Loss of speech or garbled speech AND [2] sudden onset AND [3] brief (now gone)   Negative: Loss of vision or double vision  (Exception: Similar to previous migraines.)    Protocols used: Dizziness - [id number]    "

## 2023-10-11 ENCOUNTER — PRENATAL OFFICE VISIT (OUTPATIENT)
Dept: OBGYN | Facility: CLINIC | Age: 33
End: 2023-10-11
Payer: COMMERCIAL

## 2023-10-11 VITALS
DIASTOLIC BLOOD PRESSURE: 63 MMHG | BODY MASS INDEX: 21.64 KG/M2 | HEART RATE: 74 BPM | OXYGEN SATURATION: 98 % | SYSTOLIC BLOOD PRESSURE: 97 MMHG | WEIGHT: 110.8 LBS

## 2023-10-11 DIAGNOSIS — O03.9 SPONTANEOUS ABORTION: ICD-10-CM

## 2023-10-11 DIAGNOSIS — O09.292 CURRENT PREGNANCY IN SECOND TRIMESTER WITH HISTORY OF SPONTANEOUS ABORTION DURING PRIOR PREGNANCY: Primary | ICD-10-CM

## 2023-10-11 PROCEDURE — 36415 COLL VENOUS BLD VENIPUNCTURE: CPT | Performed by: OBSTETRICS & GYNECOLOGY

## 2023-10-11 PROCEDURE — 99000 SPECIMEN HANDLING OFFICE-LAB: CPT | Performed by: OBSTETRICS & GYNECOLOGY

## 2023-10-11 PROCEDURE — 81511 FTL CGEN ABNOR FOUR ANAL: CPT | Mod: 90 | Performed by: OBSTETRICS & GYNECOLOGY

## 2023-10-11 PROCEDURE — 99207 PR PRENATAL VISIT: CPT | Performed by: OBSTETRICS & GYNECOLOGY

## 2023-10-11 NOTE — PROGRESS NOTES
17w3d   BP 97/63 (BP Location: Right arm, Cuff Size: Adult Regular)   Pulse 74   Wt 50.3 kg (110 lb 12.8 oz)   LMP 06/11/2023   SpO2 98%   BMI 21.64 kg/m    Eating well.  Nausea resolved.   Discussed genetic screening; requesting quad screen.   Plan for 20wk US.   RTC 4 weeks.   iNcko Snyder MD

## 2023-10-12 ENCOUNTER — MYC MEDICAL ADVICE (OUTPATIENT)
Dept: OBGYN | Facility: CLINIC | Age: 33
End: 2023-10-12
Payer: COMMERCIAL

## 2023-10-12 NOTE — TELEPHONE ENCOUNTER
Contacted the [facility] lab to find out what the CPT code is for the Maternal Quad screen.  Earlene at lab confirmed that the [facility] CPT code is: 75523

## 2023-10-13 LAB
# FETUSES US: NORMAL
AFP MOM SERPL: 1.01
AFP SERPL-MCNC: 51 NG/ML
AGE - REPORTED: 31.6 YR
CURRENT SMOKER: NO
FAMILY MEMBER DISEASES HX: NO
GA METHOD: NORMAL
GA: NORMAL WK
HCG MOM SERPL: 1.12
HCG SERPL-ACNC: NORMAL IU/L
HX OF HEREDITARY DISORDERS: NO
IDDM PATIENT QL: NO
INHIBIN A MOM SERPL: 0.99
INHIBIN A SERPL-MCNC: 170 PG/ML
INTEGRATED SCN PATIENT-IMP: NORMAL
PATHOLOGY STUDY: NORMAL
SPECIMEN DRAWN SERPL: NORMAL
U ESTRIOL MOM SERPL: 1.1
U ESTRIOL SERPL-MCNC: 1.66 NG/ML

## 2023-11-01 ENCOUNTER — ANCILLARY PROCEDURE (OUTPATIENT)
Dept: ULTRASOUND IMAGING | Facility: CLINIC | Age: 33
End: 2023-11-01
Attending: OBSTETRICS & GYNECOLOGY
Payer: COMMERCIAL

## 2023-11-01 DIAGNOSIS — O09.292 CURRENT PREGNANCY IN SECOND TRIMESTER WITH HISTORY OF SPONTANEOUS ABORTION DURING PRIOR PREGNANCY: ICD-10-CM

## 2023-11-01 PROCEDURE — 76805 OB US >/= 14 WKS SNGL FETUS: CPT | Mod: TC | Performed by: RADIOLOGY

## 2023-11-08 ENCOUNTER — PRENATAL OFFICE VISIT (OUTPATIENT)
Dept: OBGYN | Facility: CLINIC | Age: 33
End: 2023-11-08
Payer: COMMERCIAL

## 2023-11-08 VITALS
OXYGEN SATURATION: 98 % | HEART RATE: 89 BPM | SYSTOLIC BLOOD PRESSURE: 96 MMHG | WEIGHT: 116.4 LBS | DIASTOLIC BLOOD PRESSURE: 62 MMHG | BODY MASS INDEX: 22.73 KG/M2

## 2023-11-08 DIAGNOSIS — O43.192 MARGINAL INSERTION OF UMBILICAL CORD AFFECTING MANAGEMENT OF MOTHER IN SECOND TRIMESTER: ICD-10-CM

## 2023-11-08 DIAGNOSIS — O09.292 CURRENT PREGNANCY IN SECOND TRIMESTER WITH HISTORY OF SPONTANEOUS ABORTION DURING PRIOR PREGNANCY: Primary | ICD-10-CM

## 2023-11-08 PROCEDURE — 99207 PR PRENATAL VISIT: CPT | Performed by: OBSTETRICS & GYNECOLOGY

## 2023-11-08 NOTE — PROGRESS NOTES
21w3d.   BP 96/62 (BP Location: Right arm, Cuff Size: Adult Regular)   Pulse 89   Wt 52.8 kg (116 lb 6.4 oz)   LMP 06/11/2023   SpO2 98%   BMI 22.73 kg/m    Doing well without issues/concerns.  Quad screen not done per pt request and results negative for screened anomalies.  Routine anticipatory guidance.   RTC 4 weeks.  Nicko Snyder MD

## 2023-11-08 NOTE — PATIENT INSTRUCTIONS
"Weeks 22 to 26 of Your Pregnancy: Care Instructions  Your baby's lungs are getting ready for breathing. Your baby may respond to your voice. Your baby likely turns less, and kicks or jerks more. Jerking may mean that your baby has hiccups.    Think about taking childbirth classes. And start to think about whether you want to have pain medicine during labor.   At your next doctor visit, you may be tested for anemia and for high blood sugar that first occurs during pregnancy (gestational diabetes). These conditions can cause problems for you and your baby.     To ease discomfort, such as back pain    Change your position often. Try not to sit or stand for too long.  Get some exercise. Things like walking or stretching may help.  Try using a heating pad or cold pack.    To ease or reduce swelling in your feet, ankles, hands, and fingers    Take off your rings.  Avoid high-sodium foods, such as potato chips.  Prop up your feet, and sleep with pillows under your feet.  Try to avoid standing for long periods of time.  Do not wear tight shoes.  Wear support stockings.  Kegel exercises to prevent urine from leaking    Squeeze your muscles as if you were trying not to pass gas. Your belly, legs, and buttocks shouldn't move. Hold the squeeze for 3 seconds, then relax for 5 to 10 seconds.    Add 1 second each week until you can squeeze for 10 seconds. Repeat the exercise 10 times a session. Do 3 to 8 sessions a day. If these exercises cause you pain, stop doing them and talk with your doctor.  Follow-up care is a key part of your treatment and safety. Be sure to make and go to all appointments, and call your doctor if you are having problems. It's also a good idea to know your test results and keep a list of the medicines you take.  Where can you learn more?  Go to https://www.healthwise.net/patiented  Enter G264 in the search box to learn more about \"Weeks 22 to 26 of Your Pregnancy: Care Instructions.\"  Current as of: July " 11, 2023               Content Version: 13.8    4888-3191 [employer].   Care instructions adapted under license by your healthcare professional. If you have questions about a medical condition or this instruction, always ask your healthcare professional. [employer] disclaims any warranty or liability for your use of this information.      Learning About Screening for Gestational Diabetes  What is gestational diabetes screening?     Screening for gestational diabetes is a way to look for high blood sugar during pregnancy. You drink some very sweet liquid. Then you have a blood test to see how your body uses sugar (glucose).  How is gestational diabetes screening done?  Screening for gestational diabetes may be done in a couple of ways.  Two-part screening.  Part one (glucose challenge test): A blood sample is taken after you drink a liquid that contains sugar (glucose). You don't need to stop eating or drinking before this test. If the test shows that you don't have a lot of sugar in your blood, you don't have gestational diabetes.  Part two (oral glucose tolerance test, or OGTT): If the first test shows a lot of sugar in your blood, then you may have an OGTT. You can't eat or drink for at least 8 hours before this test. A blood sample is taken, then you drink a sweet liquid. You have more blood tests after 1 to 3 hours. If the OGTT shows that you have a lot of sugar in your blood, you may have gestational diabetes.  One-part screening.  Sometimes doctors use the OGTT on its own. If the test shows that you don't have a lot of sugar in your blood, you don't have gestational diabetes. If you do have a lot of sugar in your blood, you may have the condition.  What are the risks of screening?  Your blood glucose level may drop very low toward the end of the test. If this happens, you may feel weak, hungry, and restless. Tell your doctor if you have these symptoms. The test usually will be  "stopped.  You may vomit after drinking the sweet liquid. If this happens, you may need to take the test at a later time.  Your doctor may do more glucose tests at other times during your pregnancy.  Follow-up care is a key part of your treatment and safety. Be sure to make and go to all appointments, and call your doctor if you are having problems. It's also a good idea to know your test results and keep a list of the medicines you take.  Where can you learn more?  Go to https://www.[employer].net/patiented  Enter A472 in the search box to learn more about \"Learning About Screening for Gestational Diabetes.\"  Current as of: February 28, 2023               Content Version: 13.8    5918-5920 [employer].   Care instructions adapted under license by your healthcare professional. If you have questions about a medical condition or this instruction, always ask your healthcare professional. [employer]wise, [employer] disclaims any warranty or liability for your use of this information.      "

## 2023-11-17 ENCOUNTER — MYC MEDICAL ADVICE (OUTPATIENT)
Dept: OBGYN | Facility: CLINIC | Age: 33
End: 2023-11-17
Payer: COMMERCIAL

## 2023-11-17 DIAGNOSIS — O43.192 MARGINAL INSERTION OF UMBILICAL CORD AFFECTING MANAGEMENT OF MOTHER IN SECOND TRIMESTER: Primary | ICD-10-CM

## 2023-11-17 NOTE — TELEPHONE ENCOUNTER
"Pt last seen 11/82023 for prenatal, currently 22w5d    Per 11/1/2023 US: \"Repeat the ultrasound at 28 weeks for growth and cord insertion site. \"    RN routing to provider to advise on order  "

## 2023-11-22 NOTE — TELEPHONE ENCOUNTER
I called patient and her   The order is signed.   The patient and her  will likely schedule this at her next visit, for the following visit.   Nicko Snyder MD

## 2023-12-06 ENCOUNTER — PRENATAL OFFICE VISIT (OUTPATIENT)
Dept: OBGYN | Facility: CLINIC | Age: 33
End: 2023-12-06
Payer: COMMERCIAL

## 2023-12-06 VITALS
SYSTOLIC BLOOD PRESSURE: 98 MMHG | BODY MASS INDEX: 24.33 KG/M2 | DIASTOLIC BLOOD PRESSURE: 66 MMHG | OXYGEN SATURATION: 99 % | HEART RATE: 77 BPM | WEIGHT: 124.6 LBS

## 2023-12-06 DIAGNOSIS — O43.192 MARGINAL INSERTION OF UMBILICAL CORD AFFECTING MANAGEMENT OF MOTHER IN SECOND TRIMESTER: ICD-10-CM

## 2023-12-06 DIAGNOSIS — O09.292 CURRENT PREGNANCY IN SECOND TRIMESTER WITH HISTORY OF SPONTANEOUS ABORTION DURING PRIOR PREGNANCY: Primary | ICD-10-CM

## 2023-12-06 LAB
GLUCOSE 1H P 50 G GLC PO SERPL-MCNC: 103 MG/DL (ref 70–129)
HGB BLD-MCNC: 10.6 G/DL (ref 11.7–15.7)
T PALLIDUM AB SER QL: NONREACTIVE

## 2023-12-06 PROCEDURE — 99207 PR PRENATAL VISIT: CPT | Performed by: OBSTETRICS & GYNECOLOGY

## 2023-12-06 PROCEDURE — 82950 GLUCOSE TEST: CPT | Performed by: OBSTETRICS & GYNECOLOGY

## 2023-12-06 PROCEDURE — 86780 TREPONEMA PALLIDUM: CPT | Performed by: OBSTETRICS & GYNECOLOGY

## 2023-12-06 PROCEDURE — 36415 COLL VENOUS BLD VENIPUNCTURE: CPT | Performed by: OBSTETRICS & GYNECOLOGY

## 2023-12-06 NOTE — PROGRESS NOTES
25w3d    BP 98/66 (BP Location: Right arm, Cuff Size: Adult Regular)   Pulse 77   Wt 56.5 kg (124 lb 9.6 oz)   LMP 06/11/2023   SpO2 99%   BMI 24.33 kg/m    Doing well without issues/concerns.  Routine anticipatory guidance.    Limited ultrasound for the cord insertion site and the fetal growth.    Labs today   RTC 4wk.

## 2023-12-06 NOTE — PATIENT INSTRUCTIONS
Weeks 26 to 30 of Your Pregnancy: Care Instructions  You're starting your last trimester. You'll probably feel your baby moving around more. Your back may ache as your body gets used to your baby's size and length. Take care of yourself, and pay attention to what your body needs.    Talk to your doctor about getting the Tdap shot. It will help protect your  against whooping cough (pertussis). Also ask your doctor about flu and COVID-19 shots if you haven't had them yet. If your blood type is Rh negative, you may be given a shot of Rh immune globulin (such as RhoGAM). It can help prevent problems for your baby.   You may have [place] contractions. They are single or several strong contractions without a pattern. These are practice contractions but not the start of labor.   Be kind to yourself.     Take breaks when you're tired.  Change positions often. Don't sit for too long or stand for too long.  At work, rest during breaks if you can. If you don't get breaks, talk to your doctor about writing a letter to your employer to request them.  Avoid fumes, chemicals, and tobacco smoke.  Be sexual if you want to.     You may be interested in sex, or you may not. Everyone is different.  Sex is okay unless your doctor tells you not to.  Your belly can make it hard to find good positions for sex. [place] and explore.  Watch for signs of  labor.    These signs include:   Menstrual-like cramps. Or you may have pain or pressure in your pelvis that happens in a pattern.  About 6 or more contractions in an hour (even after rest and a glass of water).  A low, dull backache that doesn't go away when you change positions.  An increase or change in vaginal discharge.  Light vaginal bleeding or spotting.  Your water breaking.  Know what to do if you think you are having contractions.     Drink 1 or 2 glasses of water.  Lie down on your left side for at least an hour.  While on your side, feel the top of your  "belly to see if it's tight.  Write down your contractions for an hour. Time how long it is from the start of one contraction to the start of the next.  Call your doctor if you have regular contractions.  Follow-up care is a key part of your treatment and safety. Be sure to make and go to all appointments, and call your doctor if you are having problems. It's also a good idea to know your test results and keep a list of the medicines you take.  Where can you learn more?  Go to https://www.OOYYO.net/patiented  Enter S999 in the search box to learn more about \"Weeks 26 to 30 of Your Pregnancy: Care Instructions.\"  Current as of: July 11, 2023               Content Version: 13.8    3223-8736 [employer].   Care instructions adapted under license by your healthcare professional. If you have questions about a medical condition or this instruction, always ask your healthcare professional. [employer] disclaims any warranty or liability for your use of this information.      Counting Your Baby's Kicks: Care Instructions  Overview     Counting your baby's kicks is one way your doctor can tell that your baby is healthy. You will probably feel your baby move for the first time between 16 and 22 weeks. The movement may feel like flutters rather than kicks. Your baby may move more at certain times of the day. When you are active, you may notice less kicking than when you are resting. At your prenatal visits, your doctor will ask whether the baby is active.  In your last trimester, your doctor may ask you to count the number of times you feel your baby move.  Follow-up care is a key part of your treatment and safety. Be sure to make and go to all appointments, and call your doctor if you are having problems. It's also a good idea to know your test results and keep a list of the medicines you take.  How do you count fetal kicks?  A common method of checking your baby's movement is to note the length " "of time it takes to count 10 movements (such as kicks, flutters, or rolls).  Pick your baby's most active time of day to count. This may be any time from morning to evening.  If you don't feel 10 movements in an hour, have something to eat or drink and count for another hour. If you don't feel at least 10 movements in the 2-hour period, call your doctor.  Do not use an at-home Doppler heart monitor in place of counting fetal movements.  When should you call for help?   Call your doctor now or seek immediate medical care if:    You feel fewer than 10 movements in a 2-hour period.     You noticed that your baby has stopped moving or is moving less than normal.   Watch closely for changes in your health, and be sure to contact your doctor if you have any problems.  Where can you learn more?  Go to https://www.[employer].net/patiented  Enter U048 in the search box to learn more about \"Counting Your Baby's Kicks: Care Instructions.\"  Current as of: July 11, 2023               Content Version: 13.8    7069-5164 [employer].   Care instructions adapted under license by your healthcare professional. If you have questions about a medical condition or this instruction, always ask your healthcare professional. [employer] disclaims any warranty or liability for your use of this information.      "

## 2023-12-11 ENCOUNTER — MYC MEDICAL ADVICE (OUTPATIENT)
Dept: OBGYN | Facility: CLINIC | Age: 33
End: 2023-12-11
Payer: COMMERCIAL

## 2023-12-14 ENCOUNTER — MYC MEDICAL ADVICE (OUTPATIENT)
Dept: OBGYN | Facility: CLINIC | Age: 33
End: 2023-12-14
Payer: COMMERCIAL

## 2023-12-14 DIAGNOSIS — R21 RASH: Primary | ICD-10-CM

## 2023-12-15 NOTE — TELEPHONE ENCOUNTER
, 26w5d.    Pt started developing a itchy rash on her abdomen about a week ago.    She denies using any new soaps, lotions or detergents prior to noticing the rash.  She does get some relief when she put sandrita butter on the rash.    She states the rash is about the same since she first noticed it.  It has not improved or worsened.  She did send in pictures.    Advised pt to increase fluids, take Benadryl to help relieve some of the itching.    Routing to Dr. Snyder to see if he has further recommendations.    Carolyn Thomas RN

## 2023-12-20 RX ORDER — CLOBETASOL PROPIONATE 0.5 MG/G
CREAM TOPICAL 2 TIMES DAILY
Qty: 30 G | Refills: 1 | Status: SHIPPED | OUTPATIENT
Start: 2023-12-20

## 2023-12-20 NOTE — TELEPHONE ENCOUNTER
Nicko Snyder MD19 minutes ago (12:48 PM)     CB  I sent a prescription to her pharmacy for Clobetasol

## 2024-01-03 ENCOUNTER — PRENATAL OFFICE VISIT (OUTPATIENT)
Dept: OBGYN | Facility: CLINIC | Age: 34
End: 2024-01-03
Payer: COMMERCIAL

## 2024-01-03 ENCOUNTER — ANCILLARY PROCEDURE (OUTPATIENT)
Dept: ULTRASOUND IMAGING | Facility: CLINIC | Age: 34
End: 2024-01-03
Attending: OBSTETRICS & GYNECOLOGY
Payer: COMMERCIAL

## 2024-01-03 VITALS
OXYGEN SATURATION: 99 % | HEART RATE: 82 BPM | BODY MASS INDEX: 25.27 KG/M2 | SYSTOLIC BLOOD PRESSURE: 100 MMHG | DIASTOLIC BLOOD PRESSURE: 69 MMHG | WEIGHT: 129.4 LBS

## 2024-01-03 DIAGNOSIS — O99.019 ANEMIA DURING PREGNANCY: ICD-10-CM

## 2024-01-03 DIAGNOSIS — O43.192 MARGINAL INSERTION OF UMBILICAL CORD AFFECTING MANAGEMENT OF MOTHER IN SECOND TRIMESTER: ICD-10-CM

## 2024-01-03 DIAGNOSIS — O09.293 CURRENT PREGNANCY IN THIRD TRIMESTER WITH HISTORY OF SPONTANEOUS ABORTION DURING PRIOR PREGNANCY: Primary | ICD-10-CM

## 2024-01-03 PROCEDURE — 99207 PR PRENATAL VISIT: CPT | Performed by: OBSTETRICS & GYNECOLOGY

## 2024-01-03 PROCEDURE — 76816 OB US FOLLOW-UP PER FETUS: CPT | Mod: TC | Performed by: RADIOLOGY

## 2024-01-03 NOTE — PROGRESS NOTES
29w3d   /69 (BP Location: Right arm, Cuff Size: Adult Regular)   Pulse 82   Wt 58.7 kg (129 lb 6.4 oz)   LMP 06/11/2023   SpO2 99%   BMI 25.27 kg/m    Tired.  No HA, visual changes, N/V  Preliminary ultrasound results reviewed.  Good interval growth, the insertion site is greater than 3 cm.  Final report pending.   RTC 2 weeks.   Nicko Snyder MD

## 2024-01-16 ENCOUNTER — PRENATAL OFFICE VISIT (OUTPATIENT)
Dept: OBGYN | Facility: CLINIC | Age: 34
End: 2024-01-16
Payer: COMMERCIAL

## 2024-01-16 VITALS
OXYGEN SATURATION: 99 % | WEIGHT: 134 LBS | HEART RATE: 83 BPM | DIASTOLIC BLOOD PRESSURE: 66 MMHG | SYSTOLIC BLOOD PRESSURE: 99 MMHG | BODY MASS INDEX: 26.17 KG/M2

## 2024-01-16 DIAGNOSIS — Z23 NEED FOR DIPHTHERIA-TETANUS-PERTUSSIS (TDAP) VACCINE: ICD-10-CM

## 2024-01-16 DIAGNOSIS — O99.019 ANEMIA DURING PREGNANCY: ICD-10-CM

## 2024-01-16 DIAGNOSIS — O09.293 CURRENT PREGNANCY IN THIRD TRIMESTER WITH HISTORY OF SPONTANEOUS ABORTION DURING PRIOR PREGNANCY: Primary | ICD-10-CM

## 2024-01-16 PROCEDURE — 99207 PR PRENATAL VISIT: CPT | Performed by: OBSTETRICS & GYNECOLOGY

## 2024-01-16 PROCEDURE — 90715 TDAP VACCINE 7 YRS/> IM: CPT | Performed by: OBSTETRICS & GYNECOLOGY

## 2024-01-16 PROCEDURE — 90471 IMMUNIZATION ADMIN: CPT | Performed by: OBSTETRICS & GYNECOLOGY

## 2024-01-16 NOTE — PROGRESS NOTES
Current Status    Updated on: 1/16/2024  8:25 AM    Name Date Status Dose VIS Date Route Site  Lot# Given By Verified By   TDAP (Adacel,Boostrix) 1/16/2024 Given 0.5 mL 08/06/2021, Given Today IM RD [employer] [id number] Radha Page CMA --   Exp. Date NDC # Product Time Location External Inventory Class Comment   8/18/2025 17906-248-72 Adacel  8:25 AM -- -- General --   Updated by: Radha Page CMA

## 2024-01-16 NOTE — NURSING NOTE
Prior to immunization administration, verified patients identity using patient s name and date of birth. Please see Immunization Activity for additional information.     Screening Questionnaire for Adult Immunization    Are you sick today?   No   Do you have allergies to medications, food, a vaccine component or latex?   No   Have you ever had a serious reaction after receiving a vaccination?   No   Do you have a long-term health problem with heart, lung, kidney, or metabolic disease (e.g., diabetes), asthma, a blood disorder, no spleen, complement component deficiency, a cochlear implant, or a spinal fluid leak?  Are you on long-term aspirin therapy?   No   Do you have cancer, leukemia, HIV/AIDS, or any other immune system problem?   No   Do you have a parent, brother, or sister with an immune system problem?   No   In the past 3 months, have you taken medications that affect  your immune system, such as prednisone, other steroids, or anticancer drugs; drugs for the treatment of rheumatoid arthritis, Crohn s disease, or psoriasis; or have you had radiation treatments?   No   Have you had a seizure, or a brain or other nervous system problem?   No   During the past year, have you received a transfusion of blood or blood    products, or been given immune (gamma) globulin or antiviral drug?   No   For women: Are you pregnant or is there a chance you could become       pregnant during the next month?   Yes   Have you received any vaccinations in the past 4 weeks?   No     Immunization questionnaire was positive for at least one answer.  Notified [facility].      Patient instructed to remain in clinic for 15 minutes afterwards, and to report any adverse reactions.     Screening performed by Radha Page CMA on 1/16/2024 at 8:26 AM.

## 2024-01-16 NOTE — PROGRESS NOTES
31w2d.    Tired.  No HA, visual changes, N/V   Good FM, No ROM , No vaginal bleeding    She has had some difficulty with breathing, which occurs mostly when she is sitting.  She does not have it with standing.  Likely normal as the baby is increasing with size and uterus.    Tdap today.    RTC 2 wk

## 2024-01-30 ENCOUNTER — PRENATAL OFFICE VISIT (OUTPATIENT)
Dept: OBGYN | Facility: CLINIC | Age: 34
End: 2024-01-30
Payer: COMMERCIAL

## 2024-01-30 VITALS — BODY MASS INDEX: 27.03 KG/M2 | SYSTOLIC BLOOD PRESSURE: 104 MMHG | WEIGHT: 138.4 LBS | DIASTOLIC BLOOD PRESSURE: 71 MMHG

## 2024-01-30 DIAGNOSIS — O09.293 CURRENT PREGNANCY IN THIRD TRIMESTER WITH HISTORY OF SPONTANEOUS ABORTION DURING PRIOR PREGNANCY: Primary | ICD-10-CM

## 2024-01-30 DIAGNOSIS — O99.019 ANEMIA DURING PREGNANCY: ICD-10-CM

## 2024-01-30 PROCEDURE — 99207 PR PRENATAL VISIT: CPT | Performed by: OBSTETRICS & GYNECOLOGY

## 2024-02-14 ENCOUNTER — PRENATAL OFFICE VISIT (OUTPATIENT)
Dept: OBGYN | Facility: CLINIC | Age: 34
End: 2024-02-14
Payer: COMMERCIAL

## 2024-02-14 VITALS
WEIGHT: 136.8 LBS | OXYGEN SATURATION: 98 % | DIASTOLIC BLOOD PRESSURE: 68 MMHG | SYSTOLIC BLOOD PRESSURE: 101 MMHG | HEART RATE: 97 BPM | BODY MASS INDEX: 26.72 KG/M2

## 2024-02-14 DIAGNOSIS — O09.293 CURRENT PREGNANCY IN THIRD TRIMESTER WITH HISTORY OF SPONTANEOUS ABORTION DURING PRIOR PREGNANCY: Primary | ICD-10-CM

## 2024-02-14 DIAGNOSIS — O99.019 ANEMIA DURING PREGNANCY: ICD-10-CM

## 2024-02-14 PROCEDURE — 99207 PR PRENATAL VISIT: CPT | Performed by: OBSTETRICS & GYNECOLOGY

## 2024-02-14 NOTE — PROGRESS NOTES
35w3d    /68 (BP Location: Right arm, Cuff Size: Adult Regular)   Pulse 97   Wt 62.1 kg (136 lb 12.8 oz)   LMP 06/11/2023   SpO2 98%   BMI 26.72 kg/m    Tired.  No HA, visual changes, N/V   Good FM, No ROM, No vaginal bleeding.  Breast pump may be given at [facility], depending upon insurances  Peds coverage after hospital discharge discussed.   RTC 2 wk  Nicko Snyder MD

## 2024-02-20 ENCOUNTER — PRENATAL OFFICE VISIT (OUTPATIENT)
Dept: OBGYN | Facility: CLINIC | Age: 34
End: 2024-02-20
Payer: COMMERCIAL

## 2024-02-20 VITALS
WEIGHT: 138.2 LBS | HEART RATE: 84 BPM | DIASTOLIC BLOOD PRESSURE: 68 MMHG | SYSTOLIC BLOOD PRESSURE: 100 MMHG | OXYGEN SATURATION: 96 % | BODY MASS INDEX: 26.99 KG/M2

## 2024-02-20 DIAGNOSIS — Z36.85 SCREENING, ANTENATAL, FOR STREPTOCOCCUS B: ICD-10-CM

## 2024-02-20 DIAGNOSIS — O09.293 CURRENT PREGNANCY IN THIRD TRIMESTER WITH HISTORY OF SPONTANEOUS ABORTION DURING PRIOR PREGNANCY: Primary | ICD-10-CM

## 2024-02-20 PROCEDURE — 99207 PR PRENATAL VISIT: CPT | Performed by: OBSTETRICS & GYNECOLOGY

## 2024-02-20 PROCEDURE — 87653 STREP B DNA AMP PROBE: CPT | Performed by: OBSTETRICS & GYNECOLOGY

## 2024-02-20 NOTE — PROGRESS NOTES
36w2d    /68 (BP Location: Right arm, Cuff Size: Adult Regular)   Pulse 84   Wt 62.7 kg (138 lb 3.2 oz)   LMP 06/11/2023   SpO2 96%   BMI 26.99 kg/m    Cervix:  unable to reach cervix  Bedside ultrasound shows cephalic presentation  No HA, visual changes, N/V.  No ROM, No vaginal bleeding   Labor plan and warning s/s discussed.   GBS today .  RTC 1 wk

## 2024-02-21 LAB — GP B STREP DNA SPEC QL NAA+PROBE: NEGATIVE

## 2024-02-27 ENCOUNTER — PRENATAL OFFICE VISIT (OUTPATIENT)
Dept: OBGYN | Facility: CLINIC | Age: 34
End: 2024-02-27
Payer: COMMERCIAL

## 2024-02-27 VITALS
HEART RATE: 78 BPM | DIASTOLIC BLOOD PRESSURE: 69 MMHG | SYSTOLIC BLOOD PRESSURE: 102 MMHG | WEIGHT: 141.2 LBS | OXYGEN SATURATION: 97 % | BODY MASS INDEX: 27.58 KG/M2

## 2024-02-27 DIAGNOSIS — O99.019 ANEMIA DURING PREGNANCY: ICD-10-CM

## 2024-02-27 DIAGNOSIS — O09.293 CURRENT PREGNANCY IN THIRD TRIMESTER WITH HISTORY OF SPONTANEOUS ABORTION DURING PRIOR PREGNANCY: Primary | ICD-10-CM

## 2024-02-27 PROCEDURE — 99207 PR PRENATAL VISIT: CPT | Performed by: OBSTETRICS & GYNECOLOGY

## 2024-02-27 NOTE — PROGRESS NOTES
37w2d    /69 (BP Location: Right arm, Cuff Size: Adult Regular)   Pulse 78   Wt 64 kg (141 lb 3.2 oz)   LMP 06/11/2023   SpO2 97%   BMI 27.58 kg/m    Cervix:  closed, posterior and high  Tired.  No HA, visual changes, N/V  The pregnancy belt did not help her and she thinks the lower abdominal pain is worse with the pregnancy band than without it.   Beta strep testing is negative.   RTC 1 wk  Nicko Snyder MD

## 2024-03-06 ENCOUNTER — PRENATAL OFFICE VISIT (OUTPATIENT)
Dept: OBGYN | Facility: CLINIC | Age: 34
End: 2024-03-06
Payer: COMMERCIAL

## 2024-03-06 VITALS
HEART RATE: 84 BPM | SYSTOLIC BLOOD PRESSURE: 104 MMHG | OXYGEN SATURATION: 96 % | DIASTOLIC BLOOD PRESSURE: 41 MMHG | WEIGHT: 143 LBS | BODY MASS INDEX: 27.93 KG/M2

## 2024-03-06 DIAGNOSIS — O09.293 CURRENT PREGNANCY IN THIRD TRIMESTER WITH HISTORY OF SPONTANEOUS ABORTION DURING PRIOR PREGNANCY: ICD-10-CM

## 2024-03-06 DIAGNOSIS — O36.8130 DECREASED FETAL MOVEMENTS IN THIRD TRIMESTER, SINGLE OR UNSPECIFIED FETUS: Primary | ICD-10-CM

## 2024-03-06 DIAGNOSIS — O99.019 ANEMIA DURING PREGNANCY: ICD-10-CM

## 2024-03-06 PROCEDURE — 99207 PR PRENATAL VISIT: CPT | Performed by: OBSTETRICS & GYNECOLOGY

## 2024-03-06 PROCEDURE — 59025 FETAL NON-STRESS TEST: CPT | Performed by: OBSTETRICS & GYNECOLOGY

## 2024-03-06 NOTE — PROGRESS NOTES
38w3d    /41 (BP Location: Right arm, Cuff Size: Adult Regular)   Pulse 84   Wt 64.9 kg (143 lb)   LMP 06/11/2023   SpO2 96%   BMI 27.93 kg/m    Tired.  No HA, visual changes, N/V  She reports decreased fetal movement the past couple of days.     Beta strep is positive.  Antibiotics in labor.  We discussed this today at visit.   Labor plan and warning s/s discussed.   NST is ordered for the decreased fetal movement.  During the NST good FM was heard and she was able to feel the baby move.  BPP is not ordered since she has a reactive NST and she is able to feel good movements.    RTC 1 week  Nicko Snyder MD      PROCEDURE:  NST  Diagnosis:  decreased fetal movement   NST Baseline Rate 125  Variability:  moderate   Accelerations:Present  Variable Decelerations:No  Other Decelerations:No  Contractions: every 9 to 10 minutes   Further Comments:  Reactive NST/Category 1 EFM     Nicko Snyder MD

## 2024-03-07 NOTE — PATIENT INSTRUCTIONS
To Schedule an Appointment   Call: [phone]    If you have any questions regarding your visit, Please contact your care team.  [employer] [facility]: [phone]  [facility] CLINIC HOURS TELEPHONE NUMBER   Cephas Agbeh, M.D.      [facility]-Surgery Scheduler  [facility]-Surgery Scheduler       Monday - [place]:    8:00 am-4:45 pm  Tuesday - [place]:   8:00 am-4:45 pm  Friday-[place]:       8:00 am-4:45 pm  Typical Surgery Day:  Wednesday [facility]   [address] [place] [address]   [phone]   Fax [phone]    Imaging Scheduling all locations  [phone]      [facility] Delivery   [address] [place] [address] [place] [address]   [phone]    [facility]  [address] [address]  [phone]  Fax [phone]   Urgent Care locations:  [facility] Monday-Friday                               10 am - 8 pm  Saturday and                       9 am - 5 pm  Monday-Friday                              10 am- 8 pm  Saturday and                       9 am - 5 pm    [phone]   **Surgeries** Our Surgery Schedulers will contact you to schedule. If you do not receive a call within 3 business days, please call [phone].  If you need a medication refill, please contact your pharmacy. Please allow 3 business days for your refill to be completed.  As always, Thank you for trusting us with your healthcare needs!  see additional instructions from your care team below   Week 39 of Your Pregnancy: Care Instructions    [place] babies can look different than what you see in pictures or movies. Their heads can be a strange shape right after birth. And they may have swollen eyes and red marks on their faces.   You can still get pregnant even if you are breastfeeding. If you don't want to get pregnant, talk to your doctor about  "birth control.   Tips for week 39 of pregnancy  If you plan to breastfeed, get prepared.     Continue to eat healthy foods.  Keep taking your prenatal vitamins during breastfeeding if your doctor recommends it.  Talk to your doctor before taking any medicines or supplements.  Choose the right birth control for you.     Intrauterine devices (IUDs) are placed in the uterus. Sometimes the IUD can be placed right after giving birth. They work for years.  Hormonal implants are placed under the skin of the arm. They also work for years.  Depo-Provera is a shot. You get it every 3 months.  Birth control pills can be used. They're taken every day.  Tubal ligation (tying your tubes) and vasectomy are surgeries. They're permanent.  Diaphragms, spermicide, and condoms must be used each time you have sex. If you used a diaphragm before, you should get refitted after the baby is born.  A birth control patch or ring can be used. These just can't be started until several weeks after you give birth.  Follow-up care is a key part of your treatment and safety. Be sure to make and go to all appointments, and call your doctor if you are having problems. It's also a good idea to know your test results and keep a list of the medicines you take.  Where can you learn more?  Go to https://www.[employer].net/patiented  Enter A811 in the search box to learn more about \"Week 39 of Your Pregnancy: Care Instructions.\"  Current as of: July 11, 2023               Content Version: 13.8    6810-0882 [employer].   Care instructions adapted under license by your healthcare professional. If you have questions about a medical condition or this instruction, always ask your healthcare professional. [employer] disclaims any warranty or liability for your use of this information.      "

## 2024-03-11 ENCOUNTER — PRENATAL OFFICE VISIT (OUTPATIENT)
Dept: OBGYN | Facility: CLINIC | Age: 34
End: 2024-03-11
Payer: COMMERCIAL

## 2024-03-11 VITALS
DIASTOLIC BLOOD PRESSURE: 72 MMHG | SYSTOLIC BLOOD PRESSURE: 104 MMHG | WEIGHT: 141.8 LBS | HEART RATE: 70 BPM | OXYGEN SATURATION: 97 % | BODY MASS INDEX: 27.69 KG/M2

## 2024-03-11 DIAGNOSIS — O09.293 CURRENT PREGNANCY IN THIRD TRIMESTER WITH HISTORY OF SPONTANEOUS ABORTION DURING PRIOR PREGNANCY: Primary | ICD-10-CM

## 2024-03-11 PROCEDURE — 99207 PR PRENATAL VISIT: CPT | Performed by: OBSTETRICS & GYNECOLOGY

## 2024-03-11 NOTE — PROGRESS NOTES
39w1d  No HA, visual changes, N/V etc. Good fetal movement. Labor plan and warning s/s discussed. RTC 1 wk/prn.  GBS positive..Here with spouse.  Vital signs:      BP: 104/72 Pulse: 70     SpO2: 97 %       Weight: 64.3 kg (141 lb 12.8 oz)  Estimated body mass index is 27.69 kg/m  as calculated from the following:    Height as of 23: 1.524 m (5').    Weight as of this encounter: 64.3 kg (141 lb 12.8 oz).    Nurse present for exam.    ICD-10-CM    1. Current pregnancy in third trimester with history of spontaneous  during prior pregnancy  O09.293         CEPHAS AGBEH, MD.

## 2024-03-19 ENCOUNTER — PRENATAL OFFICE VISIT (OUTPATIENT)
Dept: OBGYN | Facility: CLINIC | Age: 34
End: 2024-03-19
Payer: COMMERCIAL

## 2024-03-19 VITALS
WEIGHT: 142.4 LBS | BODY MASS INDEX: 27.81 KG/M2 | OXYGEN SATURATION: 95 % | HEART RATE: 81 BPM | DIASTOLIC BLOOD PRESSURE: 77 MMHG | SYSTOLIC BLOOD PRESSURE: 115 MMHG

## 2024-03-19 DIAGNOSIS — O99.019 ANEMIA DURING PREGNANCY: ICD-10-CM

## 2024-03-19 DIAGNOSIS — O09.293 CURRENT PREGNANCY IN THIRD TRIMESTER WITH HISTORY OF SPONTANEOUS ABORTION DURING PRIOR PREGNANCY: Primary | ICD-10-CM

## 2024-03-19 DIAGNOSIS — O46.93 VAGINAL BLEEDING IN PREGNANCY, THIRD TRIMESTER: ICD-10-CM

## 2024-03-19 PROCEDURE — 99207 PR PRENATAL VISIT: CPT | Performed by: OBSTETRICS & GYNECOLOGY

## 2024-03-19 NOTE — PROGRESS NOTES
40w2d    /77 (BP Location: Right arm, Cuff Size: Adult Regular)   Pulse 81   Wt 64.6 kg (142 lb 6.4 oz)   LMP 06/11/2023   SpO2 95%   BMI 27.81 kg/m    Cervix:  visual:  1.5 cm external.  Blood pooling in vault with blood from the cervix, with cervical mucous.   No leakage of fluid.  Denies HA, visual changes   With the blood seen on the exam, I suggest to go to L&D for further evaluation and possible induction as she is greater than 40 weeks.   L&D and ONCALL physician notified.   Nicko Snyder MD

## 2024-04-16 ENCOUNTER — NURSE TRIAGE (OUTPATIENT)
Dept: NURSING | Facility: CLINIC | Age: 34
End: 2024-04-16
Payer: COMMERCIAL

## 2024-04-16 ENCOUNTER — MEDICAL CORRESPONDENCE (OUTPATIENT)
Dept: HEALTH INFORMATION MANAGEMENT | Facility: CLINIC | Age: 34
End: 2024-04-16
Payer: COMMERCIAL

## 2024-04-17 NOTE — TELEPHONE ENCOUNTER
Nurse Triage SBAR    Is this a 2nd Level Triage? NO    Situation: Pink breast milk.    Background: Per pt and , who was given permission by pt, pt noted  1 episode of pink milk after pt bumped breasts at 5-6 PM today.    Assessment: Pt and   denied any acute distress or any concerns with 1 month old baby feeding.    Protocol Recommended Disposition:   Home Care, See More Appropriate Guideline Pt and  are given care advise on sore or cracked nipples and are advised to follow up with PCP for any continued pink milk or bleeding concerns from nipples or any other concerns. Pt/ verbalized understanding and agreement with plan of care and no further questions at this time.     Recommendation:           Does the patient meet one of the following criteria for ADS visit consideration? 16+ years old, no PCP (internal or external) but seen at [facility]     TIP  Providers, please consider if this condition is appropriate for management at one of our Acute and Diagnostic Services sites.     If patient is a good candidate, please use dotphrase <dot>triageresponse and select Refer to ADS to document.    Reason for Disposition   Breastfeeding questions about mother (breast symptoms or feeling sick)   Sore or cracked nipples, questions about    Additional Information   [1] Spit up blood AND [2] sore, bleeding nipples   Negative: [1] SEVERE breast pain AND [2] fever > 103 F (39.4 C)   Negative: Mother sounds very sick or weak to the triager   Negative: [1] Breast looks infected (spreading redness, feels hot to touch) AND [2] large red area (> 2 in. or 5 cm)   Negative: [1] Breast pain or lump AND [2] mother has fever > 100.4 F (38.0 C)   Negative: [1] SEVERE pain AND [2] not improved 2 hours after pain medicine and care advice   Negative: [1] Breast looks infected (area of redness) AND [2] no fever   Negative: Blisters on nipple or areola    Protocols used: Postpartum - Breastfeeding  [id number], Postpartum - Breastfeeding Guideline [id number], Breastfeeding - Mother's Breast Symptoms or [id number]

## 2024-07-10 ENCOUNTER — E-VISIT (OUTPATIENT)
Dept: URGENT CARE | Facility: CLINIC | Age: 34
End: 2024-07-10
Payer: COMMERCIAL

## 2024-07-10 DIAGNOSIS — R21 RASH: Primary | ICD-10-CM

## 2024-07-10 PROCEDURE — 99207 PR NON-BILLABLE SERV PER CHARTING: CPT | Performed by: NURSE PRACTITIONER

## 2024-07-11 NOTE — PATIENT INSTRUCTIONS
Dear Jammie Lopes,    We are sorry you are not feeling well. Based on the responses you provided, it is recommended that you be seen in-person in urgent care so we can better evaluate your symptoms. Please click here to find the nearest urgent care location to you.   You will not be charged for this Visit. Thank you for trusting us with your care.    Lucie Arguello, CNP  Could be an abscess

## 2024-07-14 ENCOUNTER — HEALTH MAINTENANCE LETTER (OUTPATIENT)
Age: 34
End: 2024-07-14

## 2024-09-19 ENCOUNTER — MEDICAL CORRESPONDENCE (OUTPATIENT)
Dept: HEALTH INFORMATION MANAGEMENT | Facility: CLINIC | Age: 34
End: 2024-09-19
Payer: COMMERCIAL

## 2025-07-19 ENCOUNTER — HEALTH MAINTENANCE LETTER (OUTPATIENT)
Age: 35
End: 2025-07-19